# Patient Record
Sex: FEMALE | Race: WHITE | HISPANIC OR LATINO | Employment: FULL TIME | URBAN - METROPOLITAN AREA
[De-identification: names, ages, dates, MRNs, and addresses within clinical notes are randomized per-mention and may not be internally consistent; named-entity substitution may affect disease eponyms.]

---

## 2017-03-08 ENCOUNTER — VIRTUAL VISIT (OUTPATIENT)
Dept: FAMILY MEDICINE | Facility: OTHER | Age: 27
End: 2017-03-08

## 2017-03-08 NOTE — PROGRESS NOTES
"Date:   Clinician: Jodie Valladares  Clinician NPI: [id number]  Patient: Thalia Felix  Patient : 1990  Patient Address: [address] [place] [address] [place] [address]  Patient Phone: [phone]  Visit Protocol: UTI  Patient Summary:  Thalia is a 26 year old ( : 1990 ) female who initiated a Zip for a presumed bladder infection. When asked the question \"Do you have a [place] primary care physician?\", Thalia responded \"No\".    Her symptoms began 6 days ago and consist of dysuria, hesitation, foul smelling urine, and urinary frequency.   Symptom Details   Urinary Frequency: Several times each hour    She denies urgency, urinary incontinence, fever, chills, loss of appetite, nausea, hematuria, flank pain, recent antibiotic use, vomiting, abdominal pain, and vaginal discharge. Thalia has never had kidney stones. She has not been hospitalized, been a patient in a nursing home, or had a catheter in the past two weeks. She denies risk factors for sexually transmitted infections.   Thalia has not had any UTIs in the past 12 months. Her current symptoms are similar to the previous UTI symptoms. She took an antibiotic for her last infection but does not remember which one.    Thalia typically gets yeast infections when she takes antibiotics.  She states she is not pregnant and denies breastfeeding. She is currently menstruating.   She does NOT smoke or use smokeless tobacco.  MEDICATIONS:  No current medications   , ALLERGIES:   Levaquin (levofloxacin)/Cipro    Clinician Response:  Dear Thalia,  Based on the information you provided, I am unable to safely determine whether or not you have a urinary tract infection. Please be seen at your clinic or urgent care center. You will not be charged for this Zip.   Diagnosis: Unable to diagnose  Diagnosis ICD: R69  Additional Clinician Notes: Thalia, [facility]  Please be evaluated " as previously advised.

## 2017-03-17 ENCOUNTER — VIRTUAL VISIT (OUTPATIENT)
Dept: FAMILY MEDICINE | Facility: OTHER | Age: 27
End: 2017-03-17

## 2017-03-17 NOTE — PROGRESS NOTES
Date: [id number]  Clinician: Love Purvis  Clinician NPI: [id number]  Patient: osmin casanova  Patient : 1990  Patient Address: [address] [place] [address] [address] [id number]  Patient Phone: [phone]  Visit Protocol: UTI  Patient Summary:  osmin is a 26 year old ( : 1990 ) female who initiated a Zip for a presumed bladder infection.     Her symptoms began 2 days ago and consist of dysuria, hesitation, foul smelling urine, and urinary frequency.   Symptom Details   Urinary Frequency: Several times each hour    She denies urgency, urinary incontinence, fever, chills, loss of appetite, nausea, hematuria, flank pain, recent antibiotic use, vomiting, abdominal pain, and vaginal discharge. osmin has never had kidney stones. She has not been hospitalized, been a patient in a nursing home, or had a catheter in the past two weeks. She denies risk factors for sexually transmitted infections.   osmin has not had any UTIs in the past 12 months. Her current symptoms are similar to the previous UTI symptoms. She took an antibiotic for her last infection but does not remember which one.   osmin does not get yeast infections when she takes antibiotics.   She states she is not pregnant and denies breastfeeding. She has menstruated in the past month.   She does NOT smoke or use smokeless tobacco.  MEDICATIONS:  No current medications , ALLERGIES:  NKDA   Clinician Response:  Dear osmin,  Based on the information you have provided, you likely have a bladder infection, also called an acute urinary tract infection (UTI).   To treat your infection, I am prescribing:   Bactrim DS. Swallow one (1) tablet twice a day for 3 days to treat your bladder infection. Continue taking the tablets even if you feel better before all the medication is gone. There is no refill with this prescription.   Some people develop allergies to antibiotics. If you notice a new rash, significant swelling, or difficulty breathing,  stop the medication immediately and go into a clinic for physical evaluation.   To help treat your current UTI and prevent future occurrences, remember to:     Drink 8-10, 8-ounce glasses of water daily.    Urinate after sexual intercourse.    Wipe front to back after using the bathroom.     Some women may develop a yeast infection as a side effect of taking antibiotics. If you notice symptoms of a yeast infection, Zipnosis can help treat that condition as well. Simply log in and complete another Zip, which will cover all of the necessary questions to determine the best treatment for you.   You should visit a clinic for a follow-up visit if your symptoms do not improve in 1-2 days or if you experience another urinary tract infection soon after completing this treatment.  If you become pregnant during this course of treatment, stop taking the medication and contact your primary care clinician.   Diagnosis: Acute Uncomplicated Bladder Infection  Diagnosis ICD: N39.0  Additional Clinician Notes: Thalia, our records indicate that you were advised to be seen in clinic earlier today due to UTI symptoms lasting more than a week.  If this was an error, please disregard and take the antibiotic as prescribed below, if your symptoms have been going on for about 1 week,  you should be seen in clinic ASAP for a urine culture and to rule out other, more serious conditions.  Thank you for using Zipnosis.    Prescription: sulfamethoxazole-TMP DS (Bactrim DS) 800-160mg oral tablet 6 tablets, 3 days supply. Take one tablet by mouth two times a day for 3 days. Refills: 0, Refill as needed: no, Allow substitutions: yes  Prescription Sent At: March 17 16:25:30, 2017  Pharmacy: [id number] - [phone] - [address] [place] [address]

## 2020-03-01 ENCOUNTER — OFFICE VISIT (OUTPATIENT)
Dept: URGENT CARE | Facility: URGENT CARE | Age: 30
End: 2020-03-01
Payer: COMMERCIAL

## 2020-03-01 ENCOUNTER — NURSE TRIAGE (OUTPATIENT)
Dept: NURSING | Facility: CLINIC | Age: 30
End: 2020-03-01

## 2020-03-01 VITALS
DIASTOLIC BLOOD PRESSURE: 88 MMHG | TEMPERATURE: 97.9 F | OXYGEN SATURATION: 99 % | HEART RATE: 95 BPM | SYSTOLIC BLOOD PRESSURE: 128 MMHG

## 2020-03-01 DIAGNOSIS — T14.8XXA WOUND INFECTION: Primary | ICD-10-CM

## 2020-03-01 DIAGNOSIS — L08.9 WOUND INFECTION: Primary | ICD-10-CM

## 2020-03-01 PROCEDURE — 99202 OFFICE O/P NEW SF 15 MIN: CPT | Performed by: FAMILY MEDICINE

## 2020-03-01 RX ORDER — SULFAMETHOXAZOLE/TRIMETHOPRIM 800-160 MG
1 TABLET ORAL 2 TIMES DAILY
Qty: 14 TABLET | Refills: 0 | Status: SHIPPED | OUTPATIENT
Start: 2020-03-01 | End: 2020-03-08

## 2020-03-01 NOTE — PROGRESS NOTES
Subjective: 5 days ago she fell on the ice and it scraped through her clothing to give her a small abrasion on her right knee.  10 years ago she had MRSA so she is worried about infections.  It seemed to be doing okay but in the last day has gotten a lot more painful with some yellow discharge.    Objective: 2 cm very superficial abrasion with some yellow discharge and a little redness around the edge.    Assessment and plan: It is pretty small and actually looks pretty good but I cannot really explain why it is getting worse in the last day and draining more yellow pus.  We talked about culturing versus just going ahead and treating and decided to empirically treat her with Septra.  She says the reaction she had many years ago to sulfa was very minor itch and she would prefer to take the idea one for MRSA.

## 2020-03-01 NOTE — TELEPHONE ENCOUNTER
Caller scraped her knee cap falling on cement  3 days ago and now  Wound is draining  green pus and area around  wound is red and tender   triage protocol reviewed   advised assessment in  [facility]   Caller understands and will comply   Rosemary Navarro RN  FNA      Reason for Disposition    [1] Looks infected AND [2] large red area (>2 inches or 5 cm) or streak    Additional Information    Negative: [1] Major bleeding (e.g., actively dripping or spurting) AND [2] can't be stopped    Negative: Sounds like a life-threatening emergency to the triager    Negative: [1] Bleeding AND [2] won't stop after 10 minutes of direct pressure (using correct technique)    Negative: Skin is split open or gaping  (or length > 1/2 inch or 12 mm)    Negative: Skin loss goes very deep (e.g., can see bones or tendons)    Negative: Skin loss involves more than 10% of surface area (Note: the palm of the hand = 1%)    Negative: [1] Dirt in the wound AND [2] not removed with 15 minutes of scrubbing    Negative: Sounds like a serious injury to the triager    Protocols used: [id number]

## 2020-12-29 ENCOUNTER — OFFICE VISIT (OUTPATIENT)
Dept: URGENT CARE | Facility: URGENT CARE | Age: 30
End: 2020-12-29
Payer: COMMERCIAL

## 2020-12-29 ENCOUNTER — ANCILLARY PROCEDURE (OUTPATIENT)
Dept: GENERAL RADIOLOGY | Facility: CLINIC | Age: 30
End: 2020-12-29
Attending: FAMILY MEDICINE
Payer: COMMERCIAL

## 2020-12-29 VITALS
BODY MASS INDEX: 29.99 KG/M2 | SYSTOLIC BLOOD PRESSURE: 125 MMHG | HEIGHT: 65 IN | WEIGHT: 180 LBS | HEART RATE: 78 BPM | OXYGEN SATURATION: 99 % | TEMPERATURE: 98.9 F | DIASTOLIC BLOOD PRESSURE: 91 MMHG

## 2020-12-29 DIAGNOSIS — R06.02 SOB (SHORTNESS OF BREATH): Primary | ICD-10-CM

## 2020-12-29 PROCEDURE — 71046 X-RAY EXAM CHEST 2 VIEWS: CPT | Performed by: RADIOLOGY

## 2020-12-29 PROCEDURE — 99214 OFFICE O/P EST MOD 30 MIN: CPT | Performed by: FAMILY MEDICINE

## 2020-12-29 RX ORDER — ONDANSETRON 4 MG/1
TABLET, FILM COATED ORAL
COMMUNITY
Start: 2020-03-06

## 2020-12-29 RX ORDER — ELETRIPTAN HYDROBROMIDE 40 MG/1
TABLET, FILM COATED ORAL
COMMUNITY
Start: 2020-12-02

## 2020-12-29 ASSESSMENT — MIFFLIN-ST. JEOR: SCORE: 1527.35

## 2020-12-29 NOTE — PROGRESS NOTES
Subjective: Yesterday she was short of breath all day.  She woke up in the night feeling short of breath and snoring.  She does not really have a cold symptoms.  She was worried about Covid and went and did a rapid test today which was negative.  She saw her therapist yesterday and her therapist noticed that she was taking very deep breaths.  She feels like she just cannot get an adequate breath.  It feels kind of tight when she tries to take a deep breath.  She has a history of pneumonia long time ago but obviously that was a different set of symptoms.  Stress is high but nothing unusual.  She has never really had this before.  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    Objective: Vitals are stable.  ENT is normal.  Neck is normal.  Lungs are clear.  Heart is regular without murmurs.  Abdomen benign.  Chest x-ray was done and appears normal.  She seems relatively comfortable much of the time, occasionally taking deep breaths.    Assessment and plan: We had a long discussion of her symptoms.  I think they fit most with chronic hyperventilation syndrome, something that may have been set off by anxiety but is kind of taken a life of its own.  I explained how breathing patterns set it off, can get some benefit by breathing with diaphragm and by gentle exercise which should reset the automatic breathing systems.  If things do not improve further evaluation certainly could be done

## 2021-04-24 ENCOUNTER — HEALTH MAINTENANCE LETTER (OUTPATIENT)
Age: 31
End: 2021-04-24

## 2021-10-04 ENCOUNTER — HEALTH MAINTENANCE LETTER (OUTPATIENT)
Age: 31
End: 2021-10-04

## 2022-01-13 ENCOUNTER — NURSE TRIAGE (OUTPATIENT)
Dept: NURSING | Facility: CLINIC | Age: 32
End: 2022-01-13

## 2022-01-14 NOTE — TELEPHONE ENCOUNTER
Triage Call: Covid positive, fever for 3 days, stabbing chest pain, clammy, shortness of breath, no appetite. In the shower because she feels cold.     According to the protocol, patient should call 911. Care advice given. Patient verbalizes understanding but stated she is not going to call 911 and instead have someone bring her into the ED.     COVID 19 Nurse Triage Plan/Patient Instructions    Please be aware that novel coronavirus (COVID-19) may be circulating in the community. If you develop symptoms such as fever, cough, or SOB or if you have concerns about the presence of another infection including coronavirus (COVID-19), please contact your health care provider or visit [email or web address].     Disposition/Instructions    Call to EMS/911 recommended. Follow protocol based instructions.     Bring Your Own Device:  Please also bring your smart device(s) (smart phones, tablets, laptops) and their charging cables for your personal use and to communicate with your care team during your visit.    Thank you for taking steps to prevent the spread of this virus.  o Limit your contact with others.  o Wear a simple mask to cover your cough.  o Wash your hands well and often.    Resources    [facility]: About COVID-19: [email or web address]    CDC: What to Do If You're Sick: www.cdc.gov/coronavirus/2019-ncov/about/steps-when-sick.html    CDC: Ending Home Isolation: www.cdc.gov/coronavirus/2019-ncov/hcp/disposition-in-home-patients.html     CDC: Caring for Someone: www.cdc.gov/coronavirus/2019-ncov/if-you-are-sick/care-for-someone.html     [facility]: Interim Guidance for Hospital Discharge to Home: www.health.[facility].[place]/diseases/coronavirus/hcp/hospdischarge.pdf    [facility] clinical trials (COVID-19 research studies): [email or web address]/umn-clinical-trials     Below are the COVID-19 hotlines at the [place] Department of [employer] ([facility]). Interpreters are available.   o For health  questions: Call 190-397-6957 or 1-820.791.5417 (7 a.m. to 7 p.m.)  o For questions about schools and childcare: Call 759-986-1133 or 1-566.926.3034 (7 a.m. to 7 p.m.)     Jamila Clayton RN Nursing Advisor 1/13/2022 7:13 PM     Reason for Disposition    Shock suspected (e.g., cold/pale/clammy skin, too weak to stand, low BP, rapid pulse)    Additional Information    Negative: SEVERE difficulty breathing (e.g., struggling for each breath, speaks in single words)    Negative: Difficult to awaken or acting confused (e.g., disoriented, slurred speech)    Negative: Bluish (or gray) lips or face now    Protocols used: CORONAVIRUS (COVID-19) DIAGNOSED OR [id number] 8.25.2021

## 2022-05-15 ENCOUNTER — HEALTH MAINTENANCE LETTER (OUTPATIENT)
Age: 32
End: 2022-05-15

## 2022-09-11 ENCOUNTER — HEALTH MAINTENANCE LETTER (OUTPATIENT)
Age: 32
End: 2022-09-11

## 2022-12-21 ENCOUNTER — NURSE TRIAGE (OUTPATIENT)
Dept: NURSING | Facility: CLINIC | Age: 32
End: 2022-12-21

## 2022-12-21 NOTE — TELEPHONE ENCOUNTER
Patient had surgery yesterday for a deviated septum.  Patient states since surgery she has not felt good.  Patient states she has brown mucous, body aches, fever is 102.2 currently, weakness.  Patient called her surgeon and they said she needs to go in to  to be seen and evaluated. Patient then called primary provider who also suggested she go to .  Patient states she has packing her her nose and not sure how they can do a swab.  Patient wanted to know if [facility] will see her and be able to test for flu.  I said yes they can.    Patient states she will go there now.    Beatris Ray, RN   12/21/22 5:50 PM  [facility] Nurse Advisor        Reason for Disposition    General information question, no triage required and triager able to answer question    Additional Information    Negative: [1] Caller is not with the adult (patient) AND [2] reporting urgent symptoms    Negative: Lab result questions    Negative: Medication questions    Negative: Caller can't be reached by phone    Negative: Caller has already spoken to PCP or another triager    Negative: RN needs further essential information from caller in order to complete triage    Negative: Requesting regular office appointment    Negative: [1] Caller requesting NON-URGENT health information AND [2] PCP's office is the best resource    Negative: Health Information question, no triage required and triager able to answer question    Protocols used: INFORMATION ONLY CALL - [facility]

## 2022-12-22 ENCOUNTER — HOSPITAL ENCOUNTER (INPATIENT)
Facility: CLINIC | Age: 32
LOS: 1 days | Discharge: HOME | DRG: 193 | End: 2022-12-23
Attending: EMERGENCY MEDICINE | Admitting: STUDENT IN AN ORGANIZED HEALTH CARE EDUCATION/TRAINING PROGRAM
Payer: COMMERCIAL

## 2022-12-22 ENCOUNTER — APPOINTMENT (OUTPATIENT)
Dept: CT IMAGING | Facility: CLINIC | Age: 32
DRG: 193 | End: 2022-12-22
Attending: EMERGENCY MEDICINE
Payer: COMMERCIAL

## 2022-12-22 ENCOUNTER — APPOINTMENT (OUTPATIENT)
Dept: GENERAL RADIOLOGY | Facility: CLINIC | Age: 32
DRG: 193 | End: 2022-12-22
Attending: EMERGENCY MEDICINE
Payer: COMMERCIAL

## 2022-12-22 DIAGNOSIS — J96.01 ACUTE RESPIRATORY FAILURE WITH HYPOXIA (H): ICD-10-CM

## 2022-12-22 DIAGNOSIS — J10.00 PNEUMONIA DUE TO INFLUENZA A VIRUS: ICD-10-CM

## 2022-12-22 DIAGNOSIS — Z20.822 CONTACT WITH AND (SUSPECTED) EXPOSURE TO COVID-19: ICD-10-CM

## 2022-12-22 LAB
ALBUMIN SERPL BCG-MCNC: 4.1 G/DL (ref 3.5–5.2)
ALBUMIN UR-MCNC: NEGATIVE MG/DL
ALP SERPL-CCNC: 56 U/L (ref 35–104)
ALT SERPL W P-5'-P-CCNC: 28 U/L (ref 10–35)
ANION GAP SERPL CALCULATED.3IONS-SCNC: 13 MMOL/L (ref 7–15)
APPEARANCE UR: CLEAR
AST SERPL W P-5'-P-CCNC: 200 U/L (ref 10–35)
BASOPHILS # BLD AUTO: 0 10E3/UL (ref 0–0.2)
BASOPHILS NFR BLD AUTO: 0 %
BILIRUB SERPL-MCNC: 0.4 MG/DL
BILIRUB UR QL STRIP: NEGATIVE
BUN SERPL-MCNC: 8.2 MG/DL (ref 6–20)
CALCIUM SERPL-MCNC: 9 MG/DL (ref 8.6–10)
CHLORIDE SERPL-SCNC: 95 MMOL/L (ref 98–107)
COLOR UR AUTO: ABNORMAL
CREAT SERPL-MCNC: 0.65 MG/DL (ref 0.51–0.95)
DEPRECATED HCO3 PLAS-SCNC: 23 MMOL/L (ref 22–29)
EOSINOPHIL # BLD AUTO: 0 10E3/UL (ref 0–0.7)
EOSINOPHIL NFR BLD AUTO: 0 %
ERYTHROCYTE [DISTWIDTH] IN BLOOD BY AUTOMATED COUNT: 12.8 % (ref 10–15)
FLUAV RNA SPEC QL NAA+PROBE: POSITIVE
FLUBV RNA RESP QL NAA+PROBE: NEGATIVE
GFR SERPL CREATININE-BSD FRML MDRD: >90 ML/MIN/1.73M2
GLUCOSE SERPL-MCNC: 116 MG/DL (ref 70–99)
GLUCOSE UR STRIP-MCNC: NEGATIVE MG/DL
HCG SERPL QL: NEGATIVE
HCT VFR BLD AUTO: 37.5 % (ref 35–47)
HGB BLD-MCNC: 12.4 G/DL (ref 11.7–15.7)
HGB UR QL STRIP: NEGATIVE
IMM GRANULOCYTES # BLD: 0.1 10E3/UL
IMM GRANULOCYTES NFR BLD: 1 %
KETONES UR STRIP-MCNC: 20 MG/DL
LACTATE SERPL-SCNC: 0.8 MMOL/L (ref 0.7–2)
LEUKOCYTE ESTERASE UR QL STRIP: NEGATIVE
LIPASE SERPL-CCNC: 17 U/L (ref 13–60)
LYMPHOCYTES # BLD AUTO: 0.4 10E3/UL (ref 0.8–5.3)
LYMPHOCYTES NFR BLD AUTO: 6 %
MCH RBC QN AUTO: 29.2 PG (ref 26.5–33)
MCHC RBC AUTO-ENTMCNC: 33.1 G/DL (ref 31.5–36.5)
MCV RBC AUTO: 88 FL (ref 78–100)
MONOCYTES # BLD AUTO: 0.5 10E3/UL (ref 0–1.3)
MONOCYTES NFR BLD AUTO: 8 %
MUCOUS THREADS #/AREA URNS LPF: PRESENT /LPF
NEUTROPHILS # BLD AUTO: 5.8 10E3/UL (ref 1.6–8.3)
NEUTROPHILS NFR BLD AUTO: 85 %
NITRATE UR QL: NEGATIVE
NRBC # BLD AUTO: 0 10E3/UL
NRBC BLD AUTO-RTO: 0 /100
PH UR STRIP: 6.5 [PH] (ref 5–7)
PLATELET # BLD AUTO: 274 10E3/UL (ref 150–450)
POTASSIUM SERPL-SCNC: 3.5 MMOL/L (ref 3.4–5.3)
PROCALCITONIN SERPL IA-MCNC: 0.08 NG/ML
PROT SERPL-MCNC: 6.8 G/DL (ref 6.4–8.3)
RBC # BLD AUTO: 4.25 10E6/UL (ref 3.8–5.2)
RBC URINE: 3 /HPF
RSV RNA SPEC NAA+PROBE: NEGATIVE
SARS-COV-2 RNA RESP QL NAA+PROBE: NEGATIVE
SODIUM SERPL-SCNC: 131 MMOL/L (ref 136–145)
SODIUM UR-SCNC: 99 MMOL/L
SP GR UR STRIP: 1.01 (ref 1–1.03)
SQUAMOUS EPITHELIAL: 2 /HPF
UROBILINOGEN UR STRIP-MCNC: NORMAL MG/DL
WBC # BLD AUTO: 6.9 10E3/UL (ref 4–11)
WBC URINE: 1 /HPF

## 2022-12-22 PROCEDURE — 99285 EMERGENCY DEPT VISIT HI MDM: CPT | Mod: CS,25 | Performed by: EMERGENCY MEDICINE

## 2022-12-22 PROCEDURE — 250N000011 HC RX IP 250 OP 636: Performed by: EMERGENCY MEDICINE

## 2022-12-22 PROCEDURE — 74177 CT ABD & PELVIS W/CONTRAST: CPT | Mod: 26 | Performed by: RADIOLOGY

## 2022-12-22 PROCEDURE — 87205 SMEAR GRAM STAIN: CPT | Performed by: STUDENT IN AN ORGANIZED HEALTH CARE EDUCATION/TRAINING PROGRAM

## 2022-12-22 PROCEDURE — 71275 CT ANGIOGRAPHY CHEST: CPT

## 2022-12-22 PROCEDURE — 80053 COMPREHEN METABOLIC PANEL: CPT | Performed by: EMERGENCY MEDICINE

## 2022-12-22 PROCEDURE — 70450 CT HEAD/BRAIN W/O DYE: CPT

## 2022-12-22 PROCEDURE — 83605 ASSAY OF LACTIC ACID: CPT | Performed by: EMERGENCY MEDICINE

## 2022-12-22 PROCEDURE — 258N000003 HC RX IP 258 OP 636: Performed by: STUDENT IN AN ORGANIZED HEALTH CARE EDUCATION/TRAINING PROGRAM

## 2022-12-22 PROCEDURE — 120N000002 HC R&B MED SURG/OB UMMC

## 2022-12-22 PROCEDURE — 84703 CHORIONIC GONADOTROPIN ASSAY: CPT | Performed by: EMERGENCY MEDICINE

## 2022-12-22 PROCEDURE — 250N000013 HC RX MED GY IP 250 OP 250 PS 637: Performed by: STUDENT IN AN ORGANIZED HEALTH CARE EDUCATION/TRAINING PROGRAM

## 2022-12-22 PROCEDURE — 99223 1ST HOSP IP/OBS HIGH 75: CPT | Mod: GC | Performed by: STUDENT IN AN ORGANIZED HEALTH CARE EDUCATION/TRAINING PROGRAM

## 2022-12-22 PROCEDURE — 71045 X-RAY EXAM CHEST 1 VIEW: CPT

## 2022-12-22 PROCEDURE — 96374 THER/PROPH/DIAG INJ IV PUSH: CPT | Mod: 59 | Performed by: EMERGENCY MEDICINE

## 2022-12-22 PROCEDURE — 250N000011 HC RX IP 250 OP 636: Performed by: STUDENT IN AN ORGANIZED HEALTH CARE EDUCATION/TRAINING PROGRAM

## 2022-12-22 PROCEDURE — 81001 URINALYSIS AUTO W/SCOPE: CPT | Performed by: EMERGENCY MEDICINE

## 2022-12-22 PROCEDURE — 85041 AUTOMATED RBC COUNT: CPT | Performed by: EMERGENCY MEDICINE

## 2022-12-22 PROCEDURE — 74177 CT ABD & PELVIS W/CONTRAST: CPT

## 2022-12-22 PROCEDURE — 99285 EMERGENCY DEPT VISIT HI MDM: CPT | Mod: CS | Performed by: EMERGENCY MEDICINE

## 2022-12-22 PROCEDURE — 71275 CT ANGIOGRAPHY CHEST: CPT | Mod: 26 | Performed by: RADIOLOGY

## 2022-12-22 PROCEDURE — 71045 X-RAY EXAM CHEST 1 VIEW: CPT | Mod: 26 | Performed by: RADIOLOGY

## 2022-12-22 PROCEDURE — 87637 SARSCOV2&INF A&B&RSV AMP PRB: CPT | Performed by: EMERGENCY MEDICINE

## 2022-12-22 PROCEDURE — 96361 HYDRATE IV INFUSION ADD-ON: CPT | Performed by: EMERGENCY MEDICINE

## 2022-12-22 PROCEDURE — 84300 ASSAY OF URINE SODIUM: CPT | Performed by: STUDENT IN AN ORGANIZED HEALTH CARE EDUCATION/TRAINING PROGRAM

## 2022-12-22 PROCEDURE — C9803 HOPD COVID-19 SPEC COLLECT: HCPCS | Performed by: EMERGENCY MEDICINE

## 2022-12-22 PROCEDURE — 87040 BLOOD CULTURE FOR BACTERIA: CPT | Performed by: EMERGENCY MEDICINE

## 2022-12-22 PROCEDURE — 36415 COLL VENOUS BLD VENIPUNCTURE: CPT | Performed by: EMERGENCY MEDICINE

## 2022-12-22 PROCEDURE — 258N000003 HC RX IP 258 OP 636: Performed by: EMERGENCY MEDICINE

## 2022-12-22 PROCEDURE — 70450 CT HEAD/BRAIN W/O DYE: CPT | Mod: 26 | Performed by: RADIOLOGY

## 2022-12-22 PROCEDURE — 84145 PROCALCITONIN (PCT): CPT | Performed by: STUDENT IN AN ORGANIZED HEALTH CARE EDUCATION/TRAINING PROGRAM

## 2022-12-22 PROCEDURE — 250N000013 HC RX MED GY IP 250 OP 250 PS 637: Performed by: EMERGENCY MEDICINE

## 2022-12-22 PROCEDURE — 83690 ASSAY OF LIPASE: CPT | Performed by: EMERGENCY MEDICINE

## 2022-12-22 RX ORDER — TRAMADOL HYDROCHLORIDE 50 MG/1
50 TABLET ORAL EVERY 6 HOURS PRN
Status: DISCONTINUED | OUTPATIENT
Start: 2022-12-22 | End: 2022-12-23 | Stop reason: HOSPADM

## 2022-12-22 RX ORDER — NALOXONE HYDROCHLORIDE 0.4 MG/ML
0.2 INJECTION, SOLUTION INTRAMUSCULAR; INTRAVENOUS; SUBCUTANEOUS
Status: DISCONTINUED | OUTPATIENT
Start: 2022-12-22 | End: 2022-12-23 | Stop reason: HOSPADM

## 2022-12-22 RX ORDER — ACETAMINOPHEN 500 MG
1000 TABLET ORAL EVERY 8 HOURS PRN
Status: DISCONTINUED | OUTPATIENT
Start: 2022-12-22 | End: 2022-12-22

## 2022-12-22 RX ORDER — AMOXICILLIN 250 MG
1 CAPSULE ORAL 2 TIMES DAILY PRN
Status: DISCONTINUED | OUTPATIENT
Start: 2022-12-22 | End: 2022-12-23 | Stop reason: HOSPADM

## 2022-12-22 RX ORDER — CEFTRIAXONE 2 G/1
2 INJECTION, POWDER, FOR SOLUTION INTRAMUSCULAR; INTRAVENOUS EVERY 24 HOURS
Status: DISCONTINUED | OUTPATIENT
Start: 2022-12-23 | End: 2022-12-23

## 2022-12-22 RX ORDER — POLYETHYLENE GLYCOL 3350 17 G/17G
17 POWDER, FOR SOLUTION ORAL DAILY
Status: DISCONTINUED | OUTPATIENT
Start: 2022-12-22 | End: 2022-12-23 | Stop reason: HOSPADM

## 2022-12-22 RX ORDER — OSELTAMIVIR PHOSPHATE 75 MG/1
75 CAPSULE ORAL 2 TIMES DAILY
Status: DISCONTINUED | OUTPATIENT
Start: 2022-12-22 | End: 2022-12-23 | Stop reason: HOSPADM

## 2022-12-22 RX ORDER — AMOXICILLIN 250 MG
2 CAPSULE ORAL 2 TIMES DAILY PRN
Status: DISCONTINUED | OUTPATIENT
Start: 2022-12-22 | End: 2022-12-23 | Stop reason: HOSPADM

## 2022-12-22 RX ORDER — ACETAMINOPHEN 325 MG/1
975 TABLET ORAL EVERY 8 HOURS PRN
Status: DISCONTINUED | OUTPATIENT
Start: 2022-12-22 | End: 2022-12-22

## 2022-12-22 RX ORDER — LIDOCAINE 40 MG/G
CREAM TOPICAL
Status: DISCONTINUED | OUTPATIENT
Start: 2022-12-22 | End: 2022-12-23 | Stop reason: HOSPADM

## 2022-12-22 RX ORDER — ONDANSETRON 2 MG/ML
4 INJECTION INTRAMUSCULAR; INTRAVENOUS EVERY 6 HOURS PRN
Status: DISCONTINUED | OUTPATIENT
Start: 2022-12-22 | End: 2022-12-23 | Stop reason: HOSPADM

## 2022-12-22 RX ORDER — ONDANSETRON 4 MG/1
4 TABLET, ORALLY DISINTEGRATING ORAL EVERY 6 HOURS PRN
Status: DISCONTINUED | OUTPATIENT
Start: 2022-12-22 | End: 2022-12-23 | Stop reason: HOSPADM

## 2022-12-22 RX ORDER — NALOXONE HYDROCHLORIDE 0.4 MG/ML
0.4 INJECTION, SOLUTION INTRAMUSCULAR; INTRAVENOUS; SUBCUTANEOUS
Status: DISCONTINUED | OUTPATIENT
Start: 2022-12-22 | End: 2022-12-23 | Stop reason: HOSPADM

## 2022-12-22 RX ORDER — ACETAMINOPHEN 325 MG/1
650 TABLET ORAL EVERY 6 HOURS PRN
Status: DISCONTINUED | OUTPATIENT
Start: 2022-12-22 | End: 2022-12-23 | Stop reason: HOSPADM

## 2022-12-22 RX ORDER — SODIUM CHLORIDE, SODIUM LACTATE, POTASSIUM CHLORIDE, CALCIUM CHLORIDE 600; 310; 30; 20 MG/100ML; MG/100ML; MG/100ML; MG/100ML
INJECTION, SOLUTION INTRAVENOUS CONTINUOUS
Status: ACTIVE | OUTPATIENT
Start: 2022-12-22 | End: 2022-12-23

## 2022-12-22 RX ORDER — IOPAMIDOL 755 MG/ML
111 INJECTION, SOLUTION INTRAVASCULAR ONCE
Status: COMPLETED | OUTPATIENT
Start: 2022-12-22 | End: 2022-12-22

## 2022-12-22 RX ORDER — SODIUM CHLORIDE, SODIUM LACTATE, POTASSIUM CHLORIDE, CALCIUM CHLORIDE 600; 310; 30; 20 MG/100ML; MG/100ML; MG/100ML; MG/100ML
INJECTION, SOLUTION INTRAVENOUS CONTINUOUS
Status: DISCONTINUED | OUTPATIENT
Start: 2022-12-22 | End: 2022-12-22

## 2022-12-22 RX ORDER — OSELTAMIVIR PHOSPHATE 75 MG/1
75 CAPSULE ORAL DAILY
Status: DISCONTINUED | OUTPATIENT
Start: 2022-12-22 | End: 2022-12-22

## 2022-12-22 RX ORDER — CEFTRIAXONE 2 G/1
2 INJECTION, POWDER, FOR SOLUTION INTRAMUSCULAR; INTRAVENOUS ONCE
Status: COMPLETED | OUTPATIENT
Start: 2022-12-22 | End: 2022-12-22

## 2022-12-22 RX ORDER — TRAMADOL HYDROCHLORIDE 50 MG/1
50 TABLET ORAL ONCE
Status: COMPLETED | OUTPATIENT
Start: 2022-12-22 | End: 2022-12-22

## 2022-12-22 RX ORDER — POLYETHYLENE GLYCOL 3350 17 G/17G
17 POWDER, FOR SOLUTION ORAL DAILY PRN
Status: DISCONTINUED | OUTPATIENT
Start: 2022-12-22 | End: 2022-12-22

## 2022-12-22 RX ORDER — LANOLIN ALCOHOL/MO/W.PET/CERES
3 CREAM (GRAM) TOPICAL
Status: DISCONTINUED | OUTPATIENT
Start: 2022-12-22 | End: 2022-12-23 | Stop reason: HOSPADM

## 2022-12-22 RX ORDER — ONDANSETRON 2 MG/ML
4 INJECTION INTRAMUSCULAR; INTRAVENOUS ONCE
Status: COMPLETED | OUTPATIENT
Start: 2022-12-22 | End: 2022-12-22

## 2022-12-22 RX ADMIN — SODIUM CHLORIDE, POTASSIUM CHLORIDE, SODIUM LACTATE AND CALCIUM CHLORIDE: 600; 310; 30; 20 INJECTION, SOLUTION INTRAVENOUS at 18:10

## 2022-12-22 RX ADMIN — CEFTRIAXONE SODIUM 2 G: 2 INJECTION, POWDER, FOR SOLUTION INTRAMUSCULAR; INTRAVENOUS at 16:16

## 2022-12-22 RX ADMIN — ONDANSETRON HYDROCHLORIDE 4 MG: 2 INJECTION, SOLUTION INTRAMUSCULAR; INTRAVENOUS at 13:36

## 2022-12-22 RX ADMIN — ONDANSETRON HYDROCHLORIDE 4 MG: 2 INJECTION, SOLUTION INTRAMUSCULAR; INTRAVENOUS at 17:57

## 2022-12-22 RX ADMIN — ACETAMINOPHEN 650 MG: 325 TABLET, FILM COATED ORAL at 19:42

## 2022-12-22 RX ADMIN — TRAMADOL HYDROCHLORIDE 50 MG: 50 TABLET, COATED ORAL at 16:16

## 2022-12-22 RX ADMIN — SODIUM CHLORIDE 1000 ML: 9 INJECTION, SOLUTION INTRAVENOUS at 12:18

## 2022-12-22 RX ADMIN — TRAMADOL HYDROCHLORIDE 50 MG: 50 TABLET, COATED ORAL at 22:58

## 2022-12-22 RX ADMIN — SODIUM CHLORIDE, POTASSIUM CHLORIDE, SODIUM LACTATE AND CALCIUM CHLORIDE: 600; 310; 30; 20 INJECTION, SOLUTION INTRAVENOUS at 13:35

## 2022-12-22 RX ADMIN — IOPAMIDOL 111 ML: 755 INJECTION, SOLUTION INTRAVENOUS at 14:48

## 2022-12-22 RX ADMIN — AZITHROMYCIN MONOHYDRATE 500 MG: 500 INJECTION, POWDER, LYOPHILIZED, FOR SOLUTION INTRAVENOUS at 17:00

## 2022-12-22 RX ADMIN — OSELTAMIVIR PHOSPHATE 75 MG: 75 CAPSULE ORAL at 14:26

## 2022-12-22 RX ADMIN — OSELTAMIVIR PHOSPHATE 75 MG: 75 CAPSULE ORAL at 21:39

## 2022-12-22 ASSESSMENT — ACTIVITIES OF DAILY LIVING (ADL)
ADLS_ACUITY_SCORE: 35

## 2022-12-22 NOTE — ED TRIAGE NOTES
Pt had a Septoplasty done this past Tuesday. Yesterday developed fever with t-max of 103 and bodyaches. Has been taking Tylenol. Both Thalia and friend notice she is less alert.

## 2022-12-22 NOTE — H&P
[facility]    History and Physical - Medicine Service, [facility] TEAM 4       Date of Admission:  12/22/2022    Assessment & Plan      Thalia Felix is a 32 year old female admitted on 12/22/2022. 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 Hemodynamically stable.     # Acute hypoxic respiratory failure  # Influenza A infection  # Concern for superimposed bacterial pneumonia  # Fever  # Cough  # Fatigue  # AST elevation  Fever of 102 at home accompanied by cough with brown sputum, fatigue, malaise. On arrival, tachycardic, hypoxic to 86% on RA. CXR with irregular consolidative retrocardiac opacities. CTA PE negative for PE, showing bibasilar nodular consolidativ opacities with air bronchograms, concerning for infectious etiology. AST elevated on admission, likely related to influenza infection  - s/p 1L NS, mIVF @ 125mL/hr for 10 hrs  - continue IV ceftriaxone and IV azithromycin for CAP tx  - tamiflu x5 days for influenza A infection  - plan to transition to PO abx (augmentin) tomorrow if stable  - sputum culture with Gram stain ordered  - oxygen sat goal >92%, NC as needed  - follow up on blood cultures x2 collected in the ED  - nausea: PRN zofran  - pain control: tylenol PRN  - procal added on  - CBC with diff, CMP in the AM    # Abdominal pain, resolved  # Moderate stool burden on CT imaging  Pt was tender to palpation in ED. CT A/P showing moderate stool burden. Patient has not had a bowel movement since the surgery 2 days PTA. Abdomen non-tender on my exam   - start miralax daily  - senna-docusate BID PRN  - try to avoid opiates     # Recent nasal septoplasty on 12/20/22  Not concerning at this time. Consult ENT if any concern in surgical site     # Confusion, resolved  Per ED provider, patient was altered on presentation. No signs of  "encephalopathy on my exam. CTH negative for acute finding. Likely from acute infeciton.   - monitor     Diet: Combination Diet Regular Diet Adult  DVT Prophylaxis: Pneumatic Compression Devices  Novak Catheter: Not present  Fluids: on mIVF  Central Lines: None  Cardiac Monitoring: None  Code Status: Full Code    Clinically Significant Risk Factors Present on Admission         # Hyponatremia: Lowest Na = 131 mmol/L in last 2 days, will monitor as appropriate               # Overweight: Estimated body mass index is 29.95 kg/m  as calculated from the following:    Height as of this encounter: 1.651 m (5' 5\").    Weight as of this encounter: 81.6 kg (180 lb).           Disposition Plan      Expected Discharge Date: 12/24/2022                The patient's care was discussed with the Attending Physician, Dr. Puente and Patient.    Jamila Silva MD  Medicine Service, [address]  Securely message with the Vocera Web Console (learn more here)  Text page via [facility] Paging/Directory   Please see signed in provider for up to date coverage information    ______________________________________________________________________    Chief Complaint   Fever and myalgia    History is obtained from the patient    History of Present Illness   Thalia ARTURO Felix is a 32 year old female admitted on 12/22/2022. 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    Patient had a nasal septoplasty at [facility] on 12/20/22 for RICHI treatment. Patient noted that night, she had fever up to 102F. Also has been feeling generalized weakness, cough with brown color sputum, and abdominal pain. Last bowel movement Monday (1 day before surgery). Patient denies chest pain, palpitation, confusion. She felt that too many people were asking her questions all at the same time " and she was not confused. Patient denies alcohol or tobacco use.     Review of Systems    The 10 point Review of Systems is negative other than noted in the HPI or here.     Past Medical History    I have reviewed this patient's medical history and updated it with pertinent information if needed.   No past medical history on file.     Past Surgical History   I have reviewed this patient's surgical history and updated it with pertinent information if needed.   Nasal septoplasty on 12/20/22    Social History   I have reviewed this patient's social history and updated it with pertinent information if needed. Thalia Felix  reports that she has never smoked. She has never used smokeless tobacco.    Family History     No significant family history    Prior to Admission Medications   Prior to Admission Medications   Prescriptions Last Dose Informant Patient Reported? Taking?   eletriptan (RELPAX) 40 MG tablet   Yes No   ondansetron (ZOFRAN) 4 MG tablet   Yes No   Sig: TK 1 T PO Q 8 H PRF NAUSEA OR VOM      Facility-Administered Medications: None     Allergies   Allergies   Allergen Reactions     Gadolinium Hives     Pseudoephedrine      Sulfa Drugs Itching     Hydromorphone Hives, Itching and Rash     Levofloxacin Hives, Itching and Rash       Physical Exam   Vital Signs: Temp: 99.4  F (37.4  C) Temp src: Oral BP: 121/69 Pulse: 111   Resp: 18 SpO2: 91 % O2 Device: Oxymask Oxygen Delivery: 3 LPM  Weight: 180 lbs 0 oz    General Appearance: young appearing female in NAD  Eyes: anicteric sclera  HEENT: NCAT, EOMI,   Respiratory: decreased breath sounds bibasilar, no wheezes or crackles, oxymask present, nasal band present  Cardiovascular: RRR, no m/r/g  GI: soft, nontender, hypoactive BS  Skin: no lesions on exposed skin  Musculoskeletal: no tender joints  Neurologic: alert and oriented, nonfocal grossly  Psychiatric: normal mood    Data   Data reviewed today: I reviewed all medications, new labs and imaging results over  the last 24 hours. I personally reviewed CT head, CTA chest, CT A/P.     Recent Labs   Lab 12/22/22  1209   WBC 6.9   HGB 12.4   MCV 88      *   POTASSIUM 3.5   CHLORIDE 95*   CO2 23   BUN 8.2   CR 0.65   ANIONGAP 13   EDWIN 9.0   *   ALBUMIN 4.1   PROTTOTAL 6.8   BILITOTAL 0.4   ALKPHOS 56   ALT 28   *   LIPASE 17     Most Recent 3 CBC's:Recent Labs   Lab Test 12/22/22  1209   WBC 6.9   HGB 12.4   MCV 88        Most Recent 3 BMP's:Recent Labs   Lab Test 12/22/22  1209   *   POTASSIUM 3.5   CHLORIDE 95*   CO2 23   BUN 8.2   CR 0.65   ANIONGAP 13   EDWIN 9.0   *     Most Recent 2 LFT's:Recent Labs   Lab Test 12/22/22  1209   *   ALT 28   ALKPHOS 56   BILITOTAL 0.4     Recent Results (from the past 24 hour(s))   XR Chest Port 1 View    Narrative    EXAM: XR CHEST PORT 1 VIEW  12/22/2022 2:19 PM     HISTORY:  hypoxia       COMPARISON:  Chest x-ray 12/29/2020    FINDINGS:   AP portable chest, 60 degrees. Poorly timed inspiration versus mildly  low end of normal lung volumes. Trachea is midline. Cardiomediastinal  silhouette is within normal limits. There is left basilar horizontal  opacification, likely representing basilar atelectasis. Additionally,  there are couple of irregular consolidative retrocardiac opacities  seen overlying the right heart border and central retrocardiac region.  No significant pleural effusion or appreciable pneumothorax. Mild  gaseous distention of the visualized bowel, without visualized  pneumatosis or portal venous gas. No acute osseous abnormality.      Impression    IMPRESSION:  Some irregular consolidative retrocardiac opacities seen overlying the  right heart border and central retrocardiac region, difficult to  exclude infectious component. This may also represent of atelectasis  in the setting of likely left basilar atelectasis. Lateral view may be  of benefit. Recommend follow-up to clearing.    I have personally reviewed the  examination and initial interpretation  and I agree with the findings.    JAYJAY HERNANDEZ MD         SYSTEM ID:  [id number]   CT Chest Pulmonary Embolism w Contrast    Narrative    EXAM: CT CHEST PULMONARY EMBOLISM W CONTRAST  12/22/2022 3:17 PM     HISTORY:  dyspnea, eval for PE, recent surgery       COMPARISON:  Chest x-ray 12/22/2022.    TECHNIQUE: Helical technique CT chest with IV contrast per PE  protocol;axial slices with sagittal and coronal reconstructions. Total  DLP:    1917 mGy*cm. IV contrast: 111 cc IV [id number].    FINDINGS:  LUNGS:  Normal contrast filling of the pulmonary arteries with adequate IV  contrast bolus administration and timing. There is mild tapering of  the right lower lobe segmental/subsegmental branches, again, without  evidence of PE.    Central tracheobronchial tree is clear. There are bibasilar left  greater than right consolidative somewhat patchy nodular opacities  with air bronchograms, concerning for infectious etiology, such as  aspiration sequela. No significant volume loss. No pleural effusion or  pneumothorax. There are a few small scattered calcified granulomas  throughout the lung fields.    CHEST, MEDIASTINUM, AND AXILLA:  Mastoids are unremarkable. Heart size within normal limits. No  evidence of right heart strain or abnormal intracardiac filling  defects. The diameters of the main pulmonary artery and thoracic aorta  are not enlarged. No mediastinal, axillary, or appreciable hilar  lymphadenopathy. Calcified left hilar lymph node. Patent distal  esophagus.    UPPER ABDOMEN:  Non-portovenous contrast phase technique of the limited upper abdomen  is unremarkable.    VASCULATURE AND LYMPH NODES:  Grossly patent nonaneurysmal visualized major vasculature. No  appreciable lymphadenopathy.    BONES AND SOFT TISSUES:  No acute osseous abnormality. No aggressive or suspicious osseous/soft  tissue lesion identified.      Impression    IMPRESSION:  1. No evidence of  pulmonary embolus.  2. Bibasilar nodular consolidative opacities with air bronchograms in  recent documented procedure, concerning for infectious etiology,  potentially from aspiration sequela.  3. Mild sequela of prior granulomatous disease.    I have personally reviewed the examination and initial interpretation  and I agree with the findings.    MIAN JONES MD         SYSTEM ID:  [id number]   Head CT w/o contrast    Narrative    EXAM: CT HEAD W/O CONTRAST  12/22/2022 3:18 PM     HISTORY:  altered mental status and generalized weakness       COMPARISON:  None    TECHNIQUE: Using multidetector thin collimation helical acquisition  technique, axial, coronal and sagittal CT images from the skull base  to the vertex were obtained without intravenous contrast.   (topogram) image(s) also obtained and reviewed.    FINDINGS:  No acute intracranial hemorrhage, mass effect, or midline shift. No  acute loss of gray-white matter differentiation in the cerebral  hemispheres. Ventricles are proportionate to the cerebral sulci. Clear  basal cisterns.    The bony calvaria and the bones of the skull base are normal. Mild  scattered mucosal thickening of the ethmoid air cells and bilateral  maxillary sinuses. Right maxillary sinus effusion. The mastoid air  cells are clear. Grossly normal orbits.       Impression    IMPRESSION: No acute intracranial pathology.    I have personally reviewed the examination and initial interpretation  and I agree with the findings.    ISRAEL RAMIREZ MD         SYSTEM ID:  [id number]   CT Abdomen Pelvis w Contrast    Narrative    EXAMINATION: CT ABDOMEN PELVIS W CONTRAST, 12/22/2022 3:19 PM    TECHNIQUE:  Helical CT images from the lung bases through the  symphysis pubis were obtained with IV contrast.     Contrast Dose:     COMPARISON: none available     HISTORY: RLQ and mid abdominal pain, and fever    FINDINGS:    Liver: Subcentimeter hypoattenuating lesion in right hepatic dome is  too small  to characterize but favored to represent cysts.  Gallbladder: No gallstones. No evidence of acute cholecystitis.  Spleen: Normal size.  Stomach/Duodenum: Within normal limits.   Pancreas: No suspicious lesions. The pancreatic duct is not dilated.  Adrenal glands: No adrenal nodules.  Urinary system: No hydronephrosis, hydroureter, or obstructing renal  stones. Urinary bladder is unremarkable.  Bowel: Small and large bowel are normal diameter. No abnormal wall  thickening or enhancement. Normal appendix. Moderate stool burden in  the right colon.  Lymph nodes: No retroperitoneal, mesenteric, or pelvic  lymphadenopathy.  Fluid: No free fluid within the abdomen.    Bones/Soft Tissues: No suspicious osseous or soft tissue lesions.  Subtle Schmorl node deformity to the superior endplate of L1 and  inferior endplate of T12.    Lung bases/lower chest:  Partially visualized, peribronchovascular  atelectasis/airspace consolidation with mild bronchial wall thickening  and intermittent bronchial plugging throughout the lingula and  bilateral lower lobes. No pleural effusion. Small sliding-type hiatal  hernia. Heart size is normal.      Impression    IMPRESSION:    1. No acute findings in the abdomen or pelvis.  2. Moderate stool burden in the right colon.  3. Please refer to same day chest CT for further details.    I have personally reviewed the examination and initial interpretation  and I agree with the findings.    ARRON CRAMER,          SYSTEM ID:  [id number]

## 2022-12-22 NOTE — ED PROVIDER NOTES
"ED Provider Note  [facility]      History     Chief Complaint   Patient presents with     Fever     Generalized Body Aches     HPI  Thalia Felix is a 32 year old female who presents for fever, weakness, cough and body aches.   She had a septoplasty procedure by ENT at [facility] on Tuesday of this week (2 days ago) for treatment of sleep apnea. Has had some post-op bleeding that finally slowed today. Late Thursday she developed fevers with a Tmax of 102.3 at home, accompanied by productive cough, body aches and fatigue. Feels she is having a hard time standing and walking. Is on post-op oral antibiotics (Kelfex). Treating pain and fevers with tramadol, tylenol. Denies urinary symptoms. Has right sided abdominal pain today. Hx of prior abdominal surgery for endometriosis.     Arrived with tachycardia, hypoxia with O2 sat 86% on RA. Placed on O2 by oxiplus mask due to nasal splints in place.     Past Medical History  No past medical history on file.  No past surgical history on file.  eletriptan (RELPAX) 40 MG tablet  ondansetron (ZOFRAN) 4 MG tablet      Allergies   Allergen Reactions     Gadolinium Hives     Pseudoephedrine      Sulfa Drugs Itching     Hydromorphone Hives, Itching and Rash     Levofloxacin Hives, Itching and Rash     Family History  No family history on file.  Social History   Social History     Tobacco Use     Smoking status: Never     Smokeless tobacco: Never      Past medical history, past surgical history, medications, allergies, family history, and social history were reviewed with the patient. No additional pertinent items.       Review of Systems  A complete review of systems was performed with pertinent positives and negatives noted in the HPI, and all other systems negative.    Physical Exam   BP: 125/85  Pulse: (!) 129  Temp: 99.7  F (37.6  C)  Resp: 16  Height: 165.1 cm (5' 5\")  Weight: 81.6 kg (180 lb)  SpO2: (!) 86 %     Physical Exam  Gen:A&Ox3, " fatigued appearing  HEENT:PERRL, no facial tenderness or wounds, head atraumatic, oropharynx clear, mucous membranes moist, TMs clear bilaterally  Neck:no bony tenderness or step offs, no JVD, trachea midline  Back: no CVA tenderness, no midline bony tenderness  CV:tachycardia without murmurs  PULM: coarse in bases  Abd:soft, non-distended, mild tenderness that is worst in the RLQ but also present in the mid abdomen.   UE:No traumatic injuries, skin normal  LE:no traumatic injuries, skin normal, no LE edema  Neuro:CN II-XII intact, strength 5/5 throughout  Skin: no rashes or ecchymoses  ED Course      Procedures       Results for orders placed or performed during the hospital encounter of 12/22/22   XR Chest Port 1 View     Status: None    Narrative    EXAM: XR CHEST PORT 1 VIEW  12/22/2022 2:19 PM     HISTORY:  hypoxia       COMPARISON:  Chest x-ray 12/29/2020    FINDINGS:   AP portable chest, 60 degrees. Poorly timed inspiration versus mildly  low end of normal lung volumes. Trachea is midline. Cardiomediastinal  silhouette is within normal limits. There is left basilar horizontal  opacification, likely representing basilar atelectasis. Additionally,  there are couple of irregular consolidative retrocardiac opacities  seen overlying the right heart border and central retrocardiac region.  No significant pleural effusion or appreciable pneumothorax. Mild  gaseous distention of the visualized bowel, without visualized  pneumatosis or portal venous gas. No acute osseous abnormality.      Impression    IMPRESSION:  Some irregular consolidative retrocardiac opacities seen overlying the  right heart border and central retrocardiac region, difficult to  exclude infectious component. This may also represent of atelectasis  in the setting of likely left basilar atelectasis. Lateral view may be  of benefit. Recommend follow-up to clearing.    I have personally reviewed the examination and initial interpretation  and I agree  with the findings.    JAYJAY HERNANDEZ MD         SYSTEM ID:  [id number]   CT Chest Pulmonary Embolism w Contrast     Status: None    Narrative    EXAM: CT CHEST PULMONARY EMBOLISM W CONTRAST  12/22/2022 3:17 PM     HISTORY:  dyspnea, eval for PE, recent surgery       COMPARISON:  Chest x-ray 12/22/2022.    TECHNIQUE: Helical technique CT chest with IV contrast per PE  protocol;axial slices with sagittal and coronal reconstructions. Total  DLP:    1917 mGy*cm. IV contrast: 111 cc IV Isovue-370.    FINDINGS:  LUNGS:  Normal contrast filling of the pulmonary arteries with adequate IV  contrast bolus administration and timing. There is mild tapering of  the right lower lobe segmental/subsegmental branches, again, without  evidence of PE.    Central tracheobronchial tree is clear. There are bibasilar left  greater than right consolidative somewhat patchy nodular opacities  with air bronchograms, concerning for infectious etiology, such as  aspiration sequela. No significant volume loss. No pleural effusion or  pneumothorax. There are a few small scattered calcified granulomas  throughout the lung fields.    CHEST, MEDIASTINUM, AND AXILLA:  Mastoids are unremarkable. Heart size within normal limits. No  evidence of right heart strain or abnormal intracardiac filling  defects. The diameters of the main pulmonary artery and thoracic aorta  are not enlarged. No mediastinal, axillary, or appreciable hilar  lymphadenopathy. Calcified left hilar lymph node. Patent distal  esophagus.    UPPER ABDOMEN:  Non-portovenous contrast phase technique of the limited upper abdomen  is unremarkable.    VASCULATURE AND LYMPH NODES:  Grossly patent nonaneurysmal visualized major vasculature. No  appreciable lymphadenopathy.    BONES AND SOFT TISSUES:  No acute osseous abnormality. No aggressive or suspicious osseous/soft  tissue lesion identified.      Impression    IMPRESSION:  1. No evidence of pulmonary embolus.  2. Bibasilar nodular  consolidative opacities with air bronchograms in  recent documented procedure, concerning for infectious etiology,  potentially from aspiration sequela.  3. Mild sequela of prior granulomatous disease.    I have personally reviewed the examination and initial interpretation  and I agree with the findings.    MIAN JONES MD         SYSTEM ID:  [id number]   CT Abdomen Pelvis w Contrast     Status: None    Narrative    EXAMINATION: CT ABDOMEN PELVIS W CONTRAST, 12/22/2022 3:19 PM    TECHNIQUE:  Helical CT images from the lung bases through the  symphysis pubis were obtained with IV contrast.     Contrast Dose:     COMPARISON: none available     HISTORY: RLQ and mid abdominal pain, and fever    FINDINGS:    Liver: Subcentimeter hypoattenuating lesion in right hepatic dome is  too small to characterize but favored to represent cysts.  Gallbladder: No gallstones. No evidence of acute cholecystitis.  Spleen: Normal size.  Stomach/Duodenum: Within normal limits.   Pancreas: No suspicious lesions. The pancreatic duct is not dilated.  Adrenal glands: No adrenal nodules.  Urinary system: No hydronephrosis, hydroureter, or obstructing renal  stones. Urinary bladder is unremarkable.  Bowel: Small and large bowel are normal diameter. No abnormal wall  thickening or enhancement. Normal appendix. Moderate stool burden in  the right colon.  Lymph nodes: No retroperitoneal, mesenteric, or pelvic  lymphadenopathy.  Fluid: No free fluid within the abdomen.    Bones/Soft Tissues: No suspicious osseous or soft tissue lesions.  Subtle Schmorl node deformity to the superior endplate of L1 and  inferior endplate of T12.    Lung bases/lower chest:  Partially visualized, peribronchovascular  atelectasis/airspace consolidation with mild bronchial wall thickening  and intermittent bronchial plugging throughout the lingula and  bilateral lower lobes. No pleural effusion. Small sliding-type hiatal  hernia. Heart size is normal.      Impression     IMPRESSION:    1. No acute findings in the abdomen or pelvis.  2. Moderate stool burden in the right colon.  3. Please refer to same day chest CT for further details.    I have personally reviewed the examination and initial interpretation  and I agree with the findings.    ARRON CRAMER DO         SYSTEM ID:  [id number]   Head CT w/o contrast     Status: None    Narrative    EXAM: CT HEAD W/O CONTRAST  12/22/2022 3:18 PM     HISTORY:  altered mental status and generalized weakness       COMPARISON:  None    TECHNIQUE: Using multidetector thin collimation helical acquisition  technique, axial, coronal and sagittal CT images from the skull base  to the vertex were obtained without intravenous contrast.   (topogram) image(s) also obtained and reviewed.    FINDINGS:  No acute intracranial hemorrhage, mass effect, or midline shift. No  acute loss of gray-white matter differentiation in the cerebral  hemispheres. Ventricles are proportionate to the cerebral sulci. Clear  basal cisterns.    The bony calvaria and the bones of the skull base are normal. Mild  scattered mucosal thickening of the ethmoid air cells and bilateral  maxillary sinuses. Right maxillary sinus effusion. The mastoid air  cells are clear. Grossly normal orbits.       Impression    IMPRESSION: No acute intracranial pathology.    I have personally reviewed the examination and initial interpretation  and I agree with the findings.    ISRAEL RAMIREZ MD         SYSTEM ID:  [id number]   Comprehensive metabolic panel     Status: Abnormal   Result Value Ref Range    Sodium 131 (L) 136 - 145 mmol/L    Potassium 3.5 3.4 - 5.3 mmol/L    Chloride 95 (L) 98 - 107 mmol/L    Carbon Dioxide (CO2) 23 22 - 29 mmol/L    Anion Gap 13 7 - 15 mmol/L    Urea Nitrogen 8.2 6.0 - 20.0 mg/dL    Creatinine 0.65 0.51 - 0.95 mg/dL    Calcium 9.0 8.6 - 10.0 mg/dL    Glucose 116 (H) 70 - 99 mg/dL    Alkaline Phosphatase 56 35 - 104 U/L     (H) 10 - 35 U/L    ALT 28 10 -  35 U/L    Protein Total 6.8 6.4 - 8.3 g/dL    Albumin 4.1 3.5 - 5.2 g/dL    Bilirubin Total 0.4 <=1.2 mg/dL    GFR Estimate >90 >60 mL/min/1.73m2   Symptomatic Influenza A/B & SARS-CoV2 (COVID-19) Virus PCR Multiplex Nose     Status: Abnormal    Specimen: Nose; Swab   Result Value Ref Range    Influenza A PCR Positive (A) Negative    Influenza B PCR Negative Negative    RSV PCR Negative Negative    SARS CoV2 PCR Negative Negative    Narrative    Testing was performed using the Xpert Xpress CoV2/Flu/RSV Assay on the [employer] Instrument. This test should be ordered for the detection of SARS-CoV-2 and influenza viruses in individuals who meet clinical and/or epidemiological criteria. Test performance is unknown in asymptomatic patients. This test is for in vitro diagnostic use under the FDA EUA for laboratories certified under CLIA to perform high or moderate complexity testing. This test has not been FDA cleared or approved. A negative result does not rule out the presence of PCR inhibitors in the specimen or target RNA in concentration below the limit of detection for the assay. If only one viral target is positive but coinfection with multiple targets is suspected, the sample should be re-tested with another FDA cleared, approved, or authorized test, if coinfection would change clinical management. This test was validated by the [facility]. These laboratories are certified under the Clinical Laboratory Improvement Amendments of 1988 (CLIA-88) as qualified to perform high complexity laboratory testing.   Lactic acid whole blood     Status: Normal   Result Value Ref Range    Lactic Acid 0.8 0.7 - 2.0 mmol/L   CBC with platelets and differential     Status: Abnormal   Result Value Ref Range    WBC Count 6.9 4.0 - 11.0 10e3/uL    RBC Count 4.25 3.80 - 5.20 10e6/uL    Hemoglobin 12.4 11.7 - 15.7 g/dL    Hematocrit 37.5 35.0 - 47.0 %    MCV 88 78 - 100 fL    MCH 29.2 26.5 - 33.0 pg    MCHC  33.1 31.5 - 36.5 g/dL    RDW 12.8 10.0 - 15.0 %    Platelet Count 274 150 - 450 10e3/uL    % Neutrophils 85 %    % Lymphocytes 6 %    % Monocytes 8 %    % Eosinophils 0 %    % Basophils 0 %    % Immature Granulocytes 1 %    NRBCs per 100 WBC 0 <1 /100    Absolute Neutrophils 5.8 1.6 - 8.3 10e3/uL    Absolute Lymphocytes 0.4 (L) 0.8 - 5.3 10e3/uL    Absolute Monocytes 0.5 0.0 - 1.3 10e3/uL    Absolute Eosinophils 0.0 0.0 - 0.7 10e3/uL    Absolute Basophils 0.0 0.0 - 0.2 10e3/uL    Absolute Immature Granulocytes 0.1 <=0.4 10e3/uL    Absolute NRBCs 0.0 10e3/uL   Lipase     Status: Normal   Result Value Ref Range    Lipase 17 13 - 60 U/L   HCG qualitative pregnancy (blood)     Status: Normal   Result Value Ref Range    hCG Serum Qualitative Negative Negative   UA with Microscopic reflex to Culture     Status: Abnormal    Specimen: Urine, Midstream   Result Value Ref Range    Color Urine Light Yellow Colorless, Straw, Light Yellow, Yellow    Appearance Urine Clear Clear    Glucose Urine Negative Negative mg/dL    Bilirubin Urine Negative Negative    Ketones Urine 20 (A) Negative mg/dL    Specific Gravity Urine 1.013 1.003 - 1.035    Blood Urine Negative Negative    pH Urine 6.5 5.0 - 7.0    Protein Albumin Urine Negative Negative mg/dL    Urobilinogen Urine Normal Normal, 2.0 mg/dL    Nitrite Urine Negative Negative    Leukocyte Esterase Urine Negative Negative    Mucus Urine Present (A) None Seen /LPF    RBC Urine 3 (H) <=2 /HPF    WBC Urine 1 <=5 /HPF    Squamous Epithelials Urine 2 (H) <=1 /HPF    Narrative    Urine Culture not indicated   CBC with platelets differential     Status: Abnormal    Narrative    The following orders were created for panel order CBC with platelets differential.  Procedure                               Abnormality         Status                     ---------                               -----------         ------                     CBC with platelets and d...[[id number]]  Abnormal             Final result                 Please view results for these tests on the individual orders.     Medications   lactated ringers infusion ( Intravenous New Bag 12/22/22 1335)   oseltamivir (TAMIFLU) capsule 75 mg (75 mg Oral Given 12/22/22 1426)   cefTRIAXone (ROCEPHIN) 2 g vial to attach to  ml bag for ADULTS or NS 50 ml bag for PEDS (has no administration in time range)   azithromycin (ZITHROMAX) 500 mg in sodium chloride 0.9 % 250 mL intermittent infusion (has no administration in time range)   traMADol (ULTRAM) tablet 50 mg (has no administration in time range)   0.9% sodium chloride BOLUS (0 mLs Intravenous Stopped 12/22/22 1300)   ondansetron (ZOFRAN) injection 4 mg (4 mg Intravenous Given 12/22/22 1336)   iopamidol (ISOVUE-370) solution 111 mL (111 mLs Intravenous Given 12/22/22 1448)   sodium chloride (PF) 0.9% PF flush 90 mL (90 mLs Intravenous Given 12/22/22 1448)        Assessments & Plan (with Medical Decision Making)   33 yo F with a hx of sleep apnea. POD #2 from nasal septoplasty. Presenting with fevers, cough, fatigue and generalized weakness.   Arrives afebrile, but Tmax 102 at home.   Is tachycardiac and hypoxic with RA O2 sat of 86% on arrival.   Placed on O2 by oxiplus mask. Unable tolerate NC due to post-op nasal stents in place. Required 3-4L to maintain appropriate O2 sat.   Ddx included post-operative infection, pneumonia, COVID-19 infection, RSV or Flu.   IV access obtained and lab testing done, including CBC, CMP, HCG, UA, blood culture x2, viral testing.   + for influenza A. Started on tamiflu.   Imaging included CXR, CT chest and abdomen. Negative for PE or intraabdominal abnormalities. Shows bibasilar infiltrates. Treated with IV ceftriaxone and azithromycin.   Admission to the IV service.     I have reviewed the nursing notes. I have reviewed the findings, diagnosis, plan and need for follow up with the patient.    New Prescriptions    No medications on file       Final diagnoses:    Pneumonia due to influenza A virus   Acute respiratory failure with hypoxia (H)       --  Dorina Tijerina MD   [facility] EMERGENCY DEPARTMENT  12/22/2022     Dorina Tijerina MD  12/22/22 2435

## 2022-12-23 VITALS
HEART RATE: 69 BPM | RESPIRATION RATE: 18 BRPM | OXYGEN SATURATION: 96 % | HEIGHT: 65 IN | WEIGHT: 180 LBS | TEMPERATURE: 98.4 F | BODY MASS INDEX: 29.99 KG/M2 | DIASTOLIC BLOOD PRESSURE: 74 MMHG | SYSTOLIC BLOOD PRESSURE: 102 MMHG

## 2022-12-23 LAB
ALBUMIN SERPL BCG-MCNC: 3.5 G/DL (ref 3.5–5.2)
ALP SERPL-CCNC: 45 U/L (ref 35–104)
ALT SERPL W P-5'-P-CCNC: 27 U/L (ref 10–35)
ANION GAP SERPL CALCULATED.3IONS-SCNC: 10 MMOL/L (ref 7–15)
AST SERPL W P-5'-P-CCNC: 135 U/L (ref 10–35)
BASOPHILS # BLD AUTO: 0 10E3/UL (ref 0–0.2)
BASOPHILS NFR BLD AUTO: 1 %
BILIRUB SERPL-MCNC: 0.2 MG/DL
BUN SERPL-MCNC: 8.7 MG/DL (ref 6–20)
CALCIUM SERPL-MCNC: 8.8 MG/DL (ref 8.6–10)
CHLORIDE SERPL-SCNC: 104 MMOL/L (ref 98–107)
CREAT SERPL-MCNC: 0.65 MG/DL (ref 0.51–0.95)
DEPRECATED HCO3 PLAS-SCNC: 26 MMOL/L (ref 22–29)
EOSINOPHIL # BLD AUTO: 0 10E3/UL (ref 0–0.7)
EOSINOPHIL NFR BLD AUTO: 1 %
ERYTHROCYTE [DISTWIDTH] IN BLOOD BY AUTOMATED COUNT: 12.9 % (ref 10–15)
GFR SERPL CREATININE-BSD FRML MDRD: >90 ML/MIN/1.73M2
GLUCOSE SERPL-MCNC: 97 MG/DL (ref 70–99)
HCT VFR BLD AUTO: 37.8 % (ref 35–47)
HGB BLD-MCNC: 12.1 G/DL (ref 11.7–15.7)
IMM GRANULOCYTES # BLD: 0 10E3/UL
IMM GRANULOCYTES NFR BLD: 1 %
LYMPHOCYTES # BLD AUTO: 0.9 10E3/UL (ref 0.8–5.3)
LYMPHOCYTES NFR BLD AUTO: 19 %
MCH RBC QN AUTO: 29 PG (ref 26.5–33)
MCHC RBC AUTO-ENTMCNC: 32 G/DL (ref 31.5–36.5)
MCV RBC AUTO: 91 FL (ref 78–100)
MONOCYTES # BLD AUTO: 0.4 10E3/UL (ref 0–1.3)
MONOCYTES NFR BLD AUTO: 9 %
NEUTROPHILS # BLD AUTO: 3.1 10E3/UL (ref 1.6–8.3)
NEUTROPHILS NFR BLD AUTO: 69 %
NRBC # BLD AUTO: 0 10E3/UL
NRBC BLD AUTO-RTO: 0 /100
PLATELET # BLD AUTO: 235 10E3/UL (ref 150–450)
POTASSIUM SERPL-SCNC: 3.6 MMOL/L (ref 3.4–5.3)
PROT SERPL-MCNC: 6.1 G/DL (ref 6.4–8.3)
RBC # BLD AUTO: 4.17 10E6/UL (ref 3.8–5.2)
SODIUM SERPL-SCNC: 140 MMOL/L (ref 136–145)
WBC # BLD AUTO: 4.4 10E3/UL (ref 4–11)

## 2022-12-23 PROCEDURE — 99238 HOSP IP/OBS DSCHRG MGMT 30/<: CPT | Performed by: STUDENT IN AN ORGANIZED HEALTH CARE EDUCATION/TRAINING PROGRAM

## 2022-12-23 PROCEDURE — 250N000013 HC RX MED GY IP 250 OP 250 PS 637: Performed by: STUDENT IN AN ORGANIZED HEALTH CARE EDUCATION/TRAINING PROGRAM

## 2022-12-23 PROCEDURE — 36415 COLL VENOUS BLD VENIPUNCTURE: CPT | Performed by: STUDENT IN AN ORGANIZED HEALTH CARE EDUCATION/TRAINING PROGRAM

## 2022-12-23 PROCEDURE — 258N000003 HC RX IP 258 OP 636: Performed by: STUDENT IN AN ORGANIZED HEALTH CARE EDUCATION/TRAINING PROGRAM

## 2022-12-23 PROCEDURE — 85025 COMPLETE CBC W/AUTO DIFF WBC: CPT | Performed by: STUDENT IN AN ORGANIZED HEALTH CARE EDUCATION/TRAINING PROGRAM

## 2022-12-23 PROCEDURE — 80053 COMPREHEN METABOLIC PANEL: CPT | Performed by: STUDENT IN AN ORGANIZED HEALTH CARE EDUCATION/TRAINING PROGRAM

## 2022-12-23 RX ORDER — OSELTAMIVIR PHOSPHATE 75 MG/1
75 CAPSULE ORAL 2 TIMES DAILY
Qty: 8 CAPSULE | Refills: 0 | Status: SHIPPED | OUTPATIENT
Start: 2022-12-23

## 2022-12-23 RX ADMIN — ACETAMINOPHEN 650 MG: 325 TABLET, FILM COATED ORAL at 03:30

## 2022-12-23 RX ADMIN — POLYETHYLENE GLYCOL 3350 17 G: 17 POWDER, FOR SOLUTION ORAL at 08:16

## 2022-12-23 RX ADMIN — AMOXICILLIN AND CLAVULANATE POTASSIUM 1 TABLET: 875; 125 TABLET, FILM COATED ORAL at 08:13

## 2022-12-23 RX ADMIN — SODIUM CHLORIDE, POTASSIUM CHLORIDE, SODIUM LACTATE AND CALCIUM CHLORIDE: 600; 310; 30; 20 INJECTION, SOLUTION INTRAVENOUS at 01:55

## 2022-12-23 RX ADMIN — TRAMADOL HYDROCHLORIDE 50 MG: 50 TABLET, COATED ORAL at 08:13

## 2022-12-23 RX ADMIN — OSELTAMIVIR PHOSPHATE 75 MG: 75 CAPSULE ORAL at 08:13

## 2022-12-23 ASSESSMENT — ACTIVITIES OF DAILY LIVING (ADL)
ADLS_ACUITY_SCORE: 37

## 2022-12-23 NOTE — DISCHARGE SUMMARY
[facility]  Discharge Summary - Medicine & Pediatrics       Date of Admission:  12/22/2022  Date of Discharge:  12/23/2022 11:42 AM  Discharging Provider: Jamila Silva  Discharge Service: Medicine Service, ISABELL TEAM 4    Discharge Diagnoses   Acute hypoxic respiratory failure, resolved  Influenza A infection  Superimposed bacterial infection  Hx nasal septoplasty  RICHI  AST elevation      Follow-ups Needed After Discharge   Follow-up Appointments     Adult [facility] Follow-up and recommended labs and tests      Follow up with Primary Care Provider as needed if you do not feel better   after finishing your medications.     Appointments on [place] and/or [facility] (with [facility] or [facility]   provider or service). Call [phone] if you haven't heard regarding   these appointments within 7 days of discharge.             Unresulted Labs Ordered in the Past 30 Days of this Admission     Date and Time Order Name Status Description    12/22/2022  5:26 PM Respiratory Aerobic Bacterial Culture with Gram Stain Preliminary     12/22/2022 11:22 AM Blood Culture Peripheral Blood Preliminary     12/22/2022 11:22 AM Blood Culture Peripheral Blood Preliminary       These results will be followed up by PCP    Discharge Disposition   Discharged to home  Condition at discharge: Stable    Hospital Course   Thalia Felix is a 32 year old female admitted on 12/22/2022. 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 Hemodynamically stable.      # Acute hypoxic respiratory failure  # Influenza A infection  # Concern for superimposed bacterial pneumonia  # Fever  # Cough  # Fatigue  # AST elevation, improved  Fever of 102 at home accompanied by cough with brown sputum, fatigue, malaise. On arrival, tachycardic, hypoxic to 86% on RA, placed on 2 L NC. CXR with  irregular consolidative retrocardiac opacities. CTA PE negative for PE, showing bibasilar nodular consolidativ opacities with air bronchograms, concerning for infectious etiology. AST elevated on admission, likely related to influenza infection. Patient received 1 L of NS and maintenance IV fluids for 10 hours. Patient was initially started on IV ceftriaxone and azithromycin for CAP coverage. She was successfully weaned to room air. Patient was transitioned to augmentin for total 5 day course. Patient will finish 5 days of tamiflu for influenza A infection. Sputum culture collected, in process. Patient had a mild AST elevation which is likely attributed to her flu infection. It improved with appropriate treatment.    # Moderate stool burden on CT imaging  Pt was tender to palpation in ED. CT A/P showing moderate stool burden. Patient has not had a bowel movement since the sgery 2 days PTA. Abdomen non-tender on my exam. Patient was encouraged to take over the counter miralax/senna for a BM.    # Recent nasal septoplasty on 12/20/22  # RICHI  Patient to follow up with ENT as needed for post op.     # Confusion, resolved  Per ED provider, patient was altered on presentation. No signs of encephalopathy on my exam. CTH negative for acute finding. Likely from acute infeciton.     Consultations This Hospital Stay   None    Code Status   Full Code       The patient was discussed with Dr. Son.    Jamila Silva MD  [address] [facility]  [address] [place] [id number]  Phone: [phone]  Fax: [phone]  ______________________________________________________________________    Physical Exam   Vital Signs: Temp: 98.4  F (36.9  C) Temp src: Axillary BP: 102/74 Pulse: 69   Resp: 18 SpO2: 96 % O2 Device: None (Room air) Oxygen Delivery: 2 LPM  Weight: 180 lbs 0 oz  General Appearance: young appearing female resting comfortablyin NAD  Eyes: anicteric  sclera  HEENT: NCAT, EOMI  Respiratory: CTAB, no wheezes or crackles, no increased WOB, nasal band present  Cardiovascular: RRR, no m/r/g  GI: soft, nontender, active BS  Skin: no lesions on exposed skin  Musculoskeletal: no tender joints  Neurologic: alert and oriented, nonfocal grossly  Psychiatric: normal mood      Primary Care Physician   Clinic Provider    Discharge Orders      Reason for your hospital stay    You were hospitalized for fever and weakness. You were found to have influenza A infection. Based on CT scan of your chest, you likely have a superimposed bacterial infection. Please take the antiviral and antibiotic meds for 4 more days. Please return to hospital if your symptoms do not improve. Please follow up with primary care physician as needed.     Activity    Your activity upon discharge: activity as tolerated     Adult [facility] Follow-up and recommended labs and tests    Follow up with Primary Care Provider as needed if you do not feel better after finishing your medications.     Appointments on [place] and/or [facility] (with [facility] or [facility] provider or service). Call [phone] if you haven't heard regarding these appointments within 7 days of discharge.     Diet    Follow this diet upon discharge: Orders Placed This Encounter      Combination Diet Regular Diet Adult       Significant Results and Procedures   Most Recent 3 CBC's:Recent Labs   Lab Test 12/23/22  0629 12/22/22  1209   WBC 4.4 6.9   HGB 12.1 12.4   MCV 91 88    274     Most Recent 3 BMP's:Recent Labs   Lab Test 12/23/22  0629 12/22/22  1209    131*   POTASSIUM 3.6 3.5   CHLORIDE 104 95*   CO2 26 23   BUN 8.7 8.2   CR 0.65 0.65   ANIONGAP 10 13   EDWIN 8.8 9.0   GLC 97 116*     Most Recent 2 LFT's:Recent Labs   Lab Test 12/23/22  0629 12/22/22  1209   * 200*   ALT 27 28   ALKPHOS 45 56   BILITOTAL 0.2 0.4   ,   Results for orders placed or performed during the hospital encounter of 12/22/22   XR Chest Port  1 View    Narrative    EXAM: XR CHEST PORT 1 VIEW  12/22/2022 2:19 PM     HISTORY:  hypoxia       COMPARISON:  Chest x-ray 12/29/2020    FINDINGS:   AP portable chest, 60 degrees. Poorly timed inspiration versus mildly  low end of normal lung volumes. Trachea is midline. Cardiomediastinal  silhouette is within normal limits. There is left basilar horizontal  opacification, likely representing basilar atelectasis. Additionally,  there are couple of irregular consolidative retrocardiac opacities  seen overlying the right heart border and central retrocardiac region.  No significant pleural effusion or appreciable pneumothorax. Mild  gaseous distention of the visualized bowel, without visualized  pneumatosis or portal venous gas. No acute osseous abnormality.      Impression    IMPRESSION:  Some irregular consolidative retrocardiac opacities seen overlying the  right heart border and central retrocardiac region, difficult to  exclude infectious component. This may also represent of atelectasis  in the setting of likely left basilar atelectasis. Lateral view may be  of benefit. Recommend follow-up to clearing.    I have personally reviewed the examination and initial interpretation  and I agree with the findings.    JAYJAY HERNANDEZ MD         SYSTEM ID:  [id number]   CT Chest Pulmonary Embolism w Contrast    Narrative    EXAM: CT CHEST PULMONARY EMBOLISM W CONTRAST  12/22/2022 3:17 PM     HISTORY:  dyspnea, eval for PE, recent surgery       COMPARISON:  Chest x-ray 12/22/2022.    TECHNIQUE: Helical technique CT chest with IV contrast per PE  protocol;axial slices with sagittal and coronal reconstructions. Total  DLP:    1917 mGy*cm. IV contrast: 111 cc IV Isovue-[id number].    FINDINGS:  LUNGS:  Normal contrast filling of the pulmonary arteries with adequate IV  contrast bolus administration and timing. There is mild tapering of  the right lower lobe segmental/subsegmental branches, again, without  evidence of PE.    Central  tracheobronchial tree is clear. There are bibasilar left  greater than right consolidative somewhat patchy nodular opacities  with air bronchograms, concerning for infectious etiology, such as  aspiration sequela. No significant volume loss. No pleural effusion or  pneumothorax. There are a few small scattered calcified granulomas  throughout the lung fields.    CHEST, MEDIASTINUM, AND AXILLA:  Mastoids are unremarkable. Heart size within normal limits. No  evidence of right heart strain or abnormal intracardiac filling  defects. The diameters of the main pulmonary artery and thoracic aorta  are not enlarged. No mediastinal, axillary, or appreciable hilar  lymphadenopathy. Calcified left hilar lymph node. Patent distal  esophagus.    UPPER ABDOMEN:  Non-portovenous contrast phase technique of the limited upper abdomen  is unremarkable.    VASCULATURE AND LYMPH NODES:  Grossly patent nonaneurysmal visualized major vasculature. No  appreciable lymphadenopathy.    BONES AND SOFT TISSUES:  No acute osseous abnormality. No aggressive or suspicious osseous/soft  tissue lesion identified.      Impression    IMPRESSION:  1. No evidence of pulmonary embolus.  2. Bibasilar nodular consolidative opacities with air bronchograms in  recent documented procedure, concerning for infectious etiology,  potentially from aspiration sequela.  3. Mild sequela of prior granulomatous disease.    I have personally reviewed the examination and initial interpretation  and I agree with the findings.    MIAN JONES MD         SYSTEM ID:  [id number]   CT Abdomen Pelvis w Contrast    Narrative    EXAMINATION: CT ABDOMEN PELVIS W CONTRAST, 12/22/2022 3:19 PM    TECHNIQUE:  Helical CT images from the lung bases through the  symphysis pubis were obtained with IV contrast.     Contrast Dose:     COMPARISON: none available     HISTORY: RLQ and mid abdominal pain, and fever    FINDINGS:    Liver: Subcentimeter hypoattenuating lesion in right hepatic  dome is  too small to characterize but favored to represent cysts.  Gallbladder: No gallstones. No evidence of acute cholecystitis.  Spleen: Normal size.  Stomach/Duodenum: Within normal limits.   Pancreas: No suspicious lesions. The pancreatic duct is not dilated.  Adrenal glands: No adrenal nodules.  Urinary system: No hydronephrosis, hydroureter, or obstructing renal  stones. Urinary bladder is unremarkable.  Bowel: Small and large bowel are normal diameter. No abnormal wall  thickening or enhancement. Normal appendix. Moderate stool burden in  the right colon.  Lymph nodes: No retroperitoneal, mesenteric, or pelvic  lymphadenopathy.  Fluid: No free fluid within the abdomen.    Bones/Soft Tissues: No suspicious osseous or soft tissue lesions.  Subtle Schmorl node deformity to the superior endplate of L1 and  inferior endplate of T12.    Lung bases/lower chest:  Partially visualized, peribronchovascular  atelectasis/airspace consolidation with mild bronchial wall thickening  and intermittent bronchial plugging throughout the lingula and  bilateral lower lobes. No pleural effusion. Small sliding-type hiatal  hernia. Heart size is normal.      Impression    IMPRESSION:    1. No acute findings in the abdomen or pelvis.  2. Moderate stool burden in the right colon.  3. Please refer to same day chest CT for further details.    I have personally reviewed the examination and initial interpretation  and I agree with the findings.    ARRON CRAMER,          SYSTEM ID:  [id number]   Head CT w/o contrast    Narrative    EXAM: CT HEAD W/O CONTRAST  12/22/2022 3:18 PM     HISTORY:  altered mental status and generalized weakness       COMPARISON:  None    TECHNIQUE: Using multidetector thin collimation helical acquisition  technique, axial, coronal and sagittal CT images from the skull base  to the vertex were obtained without intravenous contrast.   (topogram) image(s) also obtained and reviewed.    FINDINGS:  No acute  intracranial hemorrhage, mass effect, or midline shift. No  acute loss of gray-white matter differentiation in the cerebral  hemispheres. Ventricles are proportionate to the cerebral sulci. Clear  basal cisterns.    The bony calvaria and the bones of the skull base are normal. Mild  scattered mucosal thickening of the ethmoid air cells and bilateral  maxillary sinuses. Right maxillary sinus effusion. The mastoid air  cells are clear. Grossly normal orbits.       Impression    IMPRESSION: No acute intracranial pathology.    I have personally reviewed the examination and initial interpretation  and I agree with the findings.    ISRAEL RAMIREZ MD         SYSTEM ID:  [id number]       Discharge Medications   Current Discharge Medication List      START taking these medications    Details   amoxicillin-clavulanate (AUGMENTIN) 875-125 MG tablet Take 1 tablet by mouth every 12 hours  Qty: 8 tablet, Refills: 0    Associated Diagnoses: Pneumonia due to influenza A virus      oseltamivir (TAMIFLU) 75 MG capsule Take 1 capsule (75 mg) by mouth 2 times daily  Qty: 8 capsule, Refills: 0    Associated Diagnoses: Pneumonia due to influenza A virus         CONTINUE these medications which have NOT CHANGED    Details   eletriptan (RELPAX) 40 MG tablet       ondansetron (ZOFRAN) 4 MG tablet TK 1 T PO Q 8 H PRF NAUSEA OR VOM           Allergies   Allergies   Allergen Reactions     Gadolinium Hives     Pseudoephedrine      Sulfa Drugs Itching     Hydromorphone Hives, Itching and Rash     Levofloxacin Hives, Itching and Rash

## 2022-12-23 NOTE — PLAN OF CARE
"Shift: [id number]    VS: /74 (BP Location: Right arm)   Pulse 69   Temp 98.4  F (36.9  C) (Axillary)   Resp 18   Ht 1.651 m (5' 5\")   Wt 81.6 kg (180 lb)   SpO2 96%   BMI 29.95 kg/m      Pain: Pt c/o of body aches, given PRN tylenol and tramadol with relieved.   Neuro: A/Ox4   Cardiac: On tele  Respiratory: On oxymask at 2L overnight for comfort  Diet/Appetite: Good appetite   /GI: No BM, voided twice overnight  LDA's: Right PIV SL  Skin: Dry and intact  Activity: Stand by assist   Pertinent labs: Am labs     "

## 2022-12-23 NOTE — PROGRESS NOTES
Care Management Discharge Note    Discharge Date: 12/23/2022       Discharge Disposition: Home    Discharge Services:  Transportation services    Discharge DME: none      Discharge Transportation:  Taxi    Private pay costs discussed: Not applicable    PAS Confirmation Code:  N/A  Patient/family educated on Medicare website which has current facility and service quality ratings:  N/A    Education Provided on the Discharge Plan:    Persons Notified of Discharge Plans: Yes  Patient/Family in Agreement with the Plan:  [place]    Handoff Referral Completed: No, no PCP on file.     Additional Information:  Chart reviewed. Case discussed with bedside RN and medical provider.    1049  NELY was informed pt will need a ride at discharge. NELY review chart. Pt's insurance is [employer]. NELY called [employer] Ride: [phone]. They are not setting up same day rides due to weather. NELY confirmed pt's address with bedside nurse, Myrna. Address pt will be going to is: [address] [place] [address].     NELY called [employer] ([phone]) set up one way taxi ride for pt.      is available ad will continue to follow for discharge planning and supports as needed.     _______________________    INDU Stewart, LSW  ED/OBS   [facility]  Phone: [phone]  Pager: [phone]  Fax: [phone]     On-call pager, [phone], 4:00 pm to midnight

## 2022-12-23 NOTE — PLAN OF CARE
Transferred from ED @ 18:00.  Admitted for    fatigue, pneumonia and influenza A and acute respiratory failure.  Afebrile, VSS on RA.  SOB on exertion.  Alert and oriented x4.  Denies pain.  Nausea resolving.  Nose gauze changed.  R PIV infusing LR @ 125 ml/hr.  Up with SBA.  Continue plan of care.

## 2022-12-23 NOTE — PROGRESS NOTES
"[id number]    Vital signs:  Temp: 98.4  F (36.9  C) Temp src: Axillary BP: 102/74 Pulse: 69   Resp: 18 SpO2: 96 % O2 Device: None (Room air) Oxygen Delivery: 2 LPM Height: 165.1 cm (5' 5\") Weight: 81.6 kg (180 lb)  Estimated body mass index is 29.95 kg/m  as calculated from the following:    Height as of this encounter: 1.651 m (5' 5\").    Weight as of this encounter: 81.6 kg (180 lb).    Nursing Focus: Discharge    D: Patient discharged to home at 1120 via taxi set up by NELY.    I: Discharge prescriptions sent to discharge pharmacy to be filled. All discharge medications and instructions reviewed with the patient. Patient instructed to call clinic triage nurse if she experiences a fever >100.4, uncontrolled nausea, vomiting, diarrhea, or pain; or experiences any signs or symptoms of bleeding. Other phone numbers to call with questions or concerns after discharge reviewed. PIV removed. Education completed. All belonging sent with patient.    A: Thalia verbalized understanding of discharge medications and instructions. Discharge medications picked up by RN and handed to pt.     P: Patient to follow-up w/ PCP as needed.      "

## 2022-12-25 LAB
BACTERIA SPT CULT: NORMAL
GRAM STAIN RESULT: NORMAL

## 2022-12-27 LAB
BACTERIA BLD CULT: NO GROWTH
BACTERIA BLD CULT: NO GROWTH

## 2022-12-30 ENCOUNTER — APPOINTMENT (OUTPATIENT)
Dept: CT IMAGING | Facility: CLINIC | Age: 32
End: 2022-12-30
Attending: EMERGENCY MEDICINE
Payer: COMMERCIAL

## 2022-12-30 ENCOUNTER — HOSPITAL ENCOUNTER (EMERGENCY)
Facility: CLINIC | Age: 32
Discharge: HOME | End: 2022-12-30
Attending: EMERGENCY MEDICINE | Admitting: EMERGENCY MEDICINE
Payer: COMMERCIAL

## 2022-12-30 VITALS
WEIGHT: 210 LBS | OXYGEN SATURATION: 99 % | SYSTOLIC BLOOD PRESSURE: 115 MMHG | DIASTOLIC BLOOD PRESSURE: 69 MMHG | HEIGHT: 65 IN | HEART RATE: 72 BPM | BODY MASS INDEX: 34.99 KG/M2 | TEMPERATURE: 98 F | RESPIRATION RATE: 17 BRPM

## 2022-12-30 DIAGNOSIS — R61 DIAPHORESIS: ICD-10-CM

## 2022-12-30 DIAGNOSIS — R06.02 SHORTNESS OF BREATH: ICD-10-CM

## 2022-12-30 LAB
ALBUMIN SERPL BCG-MCNC: 4.2 G/DL (ref 3.5–5.2)
ALBUMIN UR-MCNC: NEGATIVE MG/DL
ALP SERPL-CCNC: 59 U/L (ref 35–104)
ALT SERPL W P-5'-P-CCNC: 14 U/L (ref 10–35)
ANION GAP SERPL CALCULATED.3IONS-SCNC: 13 MMOL/L (ref 7–15)
APPEARANCE UR: CLEAR
AST SERPL W P-5'-P-CCNC: 20 U/L (ref 10–35)
ATRIAL RATE - MUSE: 97 BPM
BASOPHILS # BLD AUTO: 0 10E3/UL (ref 0–0.2)
BASOPHILS NFR BLD AUTO: 1 %
BILIRUB DIRECT SERPL-MCNC: <0.2 MG/DL (ref 0–0.3)
BILIRUB SERPL-MCNC: 0.2 MG/DL
BILIRUB UR QL STRIP: NEGATIVE
BUN SERPL-MCNC: 13.3 MG/DL (ref 6–20)
CALCIUM SERPL-MCNC: 9.6 MG/DL (ref 8.6–10)
CHLORIDE SERPL-SCNC: 105 MMOL/L (ref 98–107)
COLOR UR AUTO: ABNORMAL
CREAT SERPL-MCNC: 0.61 MG/DL (ref 0.51–0.95)
DEPRECATED HCO3 PLAS-SCNC: 22 MMOL/L (ref 22–29)
DIASTOLIC BLOOD PRESSURE - MUSE: NORMAL MMHG
EOSINOPHIL # BLD AUTO: 0.1 10E3/UL (ref 0–0.7)
EOSINOPHIL NFR BLD AUTO: 1 %
ERYTHROCYTE [DISTWIDTH] IN BLOOD BY AUTOMATED COUNT: 12.4 % (ref 10–15)
GFR SERPL CREATININE-BSD FRML MDRD: >90 ML/MIN/1.73M2
GLUCOSE SERPL-MCNC: 97 MG/DL (ref 70–99)
GLUCOSE UR STRIP-MCNC: NEGATIVE MG/DL
HCG SERPL QL: NEGATIVE
HCT VFR BLD AUTO: 40.8 % (ref 35–47)
HGB BLD-MCNC: 13.6 G/DL (ref 11.7–15.7)
HGB UR QL STRIP: NEGATIVE
IMM GRANULOCYTES # BLD: 0.1 10E3/UL
IMM GRANULOCYTES NFR BLD: 2 %
INTERPRETATION ECG - MUSE: NORMAL
KETONES UR STRIP-MCNC: NEGATIVE MG/DL
LEUKOCYTE ESTERASE UR QL STRIP: NEGATIVE
LYMPHOCYTES # BLD AUTO: 2.4 10E3/UL (ref 0.8–5.3)
LYMPHOCYTES NFR BLD AUTO: 36 %
MCH RBC QN AUTO: 28.5 PG (ref 26.5–33)
MCHC RBC AUTO-ENTMCNC: 33.3 G/DL (ref 31.5–36.5)
MCV RBC AUTO: 86 FL (ref 78–100)
MONOCYTES # BLD AUTO: 0.5 10E3/UL (ref 0–1.3)
MONOCYTES NFR BLD AUTO: 8 %
MUCOUS THREADS #/AREA URNS LPF: PRESENT /LPF
NEUTROPHILS # BLD AUTO: 3.5 10E3/UL (ref 1.6–8.3)
NEUTROPHILS NFR BLD AUTO: 52 %
NITRATE UR QL: NEGATIVE
NRBC # BLD AUTO: 0 10E3/UL
NRBC BLD AUTO-RTO: 0 /100
P AXIS - MUSE: 34 DEGREES
PH UR STRIP: 5.5 [PH] (ref 5–7)
PLATELET # BLD AUTO: 436 10E3/UL (ref 150–450)
POTASSIUM SERPL-SCNC: 3.6 MMOL/L (ref 3.4–5.3)
PR INTERVAL - MUSE: 136 MS
PROT SERPL-MCNC: 7 G/DL (ref 6.4–8.3)
QRS DURATION - MUSE: 86 MS
QT - MUSE: 350 MS
QTC - MUSE: 444 MS
R AXIS - MUSE: -2 DEGREES
RBC # BLD AUTO: 4.77 10E6/UL (ref 3.8–5.2)
RBC URINE: <1 /HPF
SODIUM SERPL-SCNC: 140 MMOL/L (ref 136–145)
SP GR UR STRIP: 1.01 (ref 1–1.03)
SQUAMOUS EPITHELIAL: 4 /HPF
SYSTOLIC BLOOD PRESSURE - MUSE: NORMAL MMHG
T AXIS - MUSE: 5 DEGREES
TROPONIN T SERPL HS-MCNC: 12 NG/L
UROBILINOGEN UR STRIP-MCNC: NORMAL MG/DL
VENTRICULAR RATE- MUSE: 97 BPM
WBC # BLD AUTO: 6.6 10E3/UL (ref 4–11)
WBC URINE: <1 /HPF

## 2022-12-30 PROCEDURE — 82310 ASSAY OF CALCIUM: CPT | Performed by: EMERGENCY MEDICINE

## 2022-12-30 PROCEDURE — 85025 COMPLETE CBC W/AUTO DIFF WBC: CPT | Performed by: EMERGENCY MEDICINE

## 2022-12-30 PROCEDURE — 93010 ELECTROCARDIOGRAM REPORT: CPT | Performed by: EMERGENCY MEDICINE

## 2022-12-30 PROCEDURE — 96361 HYDRATE IV INFUSION ADD-ON: CPT

## 2022-12-30 PROCEDURE — 84484 ASSAY OF TROPONIN QUANT: CPT | Performed by: EMERGENCY MEDICINE

## 2022-12-30 PROCEDURE — 81001 URINALYSIS AUTO W/SCOPE: CPT | Performed by: EMERGENCY MEDICINE

## 2022-12-30 PROCEDURE — 82248 BILIRUBIN DIRECT: CPT | Performed by: EMERGENCY MEDICINE

## 2022-12-30 PROCEDURE — 99285 EMERGENCY DEPT VISIT HI MDM: CPT | Mod: 25

## 2022-12-30 PROCEDURE — 258N000003 HC RX IP 258 OP 636: Performed by: EMERGENCY MEDICINE

## 2022-12-30 PROCEDURE — 36415 COLL VENOUS BLD VENIPUNCTURE: CPT | Performed by: EMERGENCY MEDICINE

## 2022-12-30 PROCEDURE — 250N000011 HC RX IP 250 OP 636: Performed by: EMERGENCY MEDICINE

## 2022-12-30 PROCEDURE — 99285 EMERGENCY DEPT VISIT HI MDM: CPT | Mod: 25 | Performed by: EMERGENCY MEDICINE

## 2022-12-30 PROCEDURE — 71275 CT ANGIOGRAPHY CHEST: CPT | Mod: 26 | Performed by: RADIOLOGY

## 2022-12-30 PROCEDURE — 93005 ELECTROCARDIOGRAM TRACING: CPT

## 2022-12-30 PROCEDURE — 84703 CHORIONIC GONADOTROPIN ASSAY: CPT | Performed by: EMERGENCY MEDICINE

## 2022-12-30 PROCEDURE — 71275 CT ANGIOGRAPHY CHEST: CPT

## 2022-12-30 PROCEDURE — 96360 HYDRATION IV INFUSION INIT: CPT | Mod: 59

## 2022-12-30 RX ORDER — IOPAMIDOL 755 MG/ML
69 INJECTION, SOLUTION INTRAVASCULAR ONCE
Status: COMPLETED | OUTPATIENT
Start: 2022-12-30 | End: 2022-12-30

## 2022-12-30 RX ADMIN — SODIUM CHLORIDE 1000 ML: 9 INJECTION, SOLUTION INTRAVENOUS at 17:11

## 2022-12-30 RX ADMIN — IOPAMIDOL 69 ML: 755 INJECTION, SOLUTION INTRAVENOUS at 18:15

## 2022-12-30 RX ADMIN — SODIUM CHLORIDE 1000 ML: 9 INJECTION, SOLUTION INTRAVENOUS at 18:36

## 2022-12-30 ASSESSMENT — ENCOUNTER SYMPTOMS
COUGH: 1
ARTHRALGIAS: 0
CONFUSION: 0
DIFFICULTY URINATING: 0
FEVER: 0
SHORTNESS OF BREATH: 1
NAUSEA: 1
HEADACHES: 0
DIAPHORESIS: 1
VOMITING: 1
PALPITATIONS: 1
FATIGUE: 1
FLANK PAIN: 0
ABDOMINAL PAIN: 0
CHILLS: 1
DIARRHEA: 0
SORE THROAT: 1
COLOR CHANGE: 0
EYE REDNESS: 0

## 2022-12-30 ASSESSMENT — ACTIVITIES OF DAILY LIVING (ADL)
ADLS_ACUITY_SCORE: 35

## 2022-12-30 NOTE — ED PROVIDER NOTES
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      She had a septoplasty on 12/20/22. On 12/22/22 was in the ED for influenza, also had superimposed bacterial pneumonia. Was admitted for one day, was on oxygen briefly and then weaned off.     Cough is improved from previous, still present but improved. Now coughing up light yellow sputum, was previously dark brown. Was given Tamiflu for this - took 3 days and then discontinued it because of nausea and vomiting. Felt better from a n/v standpoint after stopping this. Was also given Augmentin due to superimposed bacterial pneumonia, and finished the entire course.  Now feels SOB - unsure if this is worse than previous. She is feeling chest pressure especially in mid sternal region and just to the left particularly with deep inspiration. Also feels upper back pain on the left side, again with inspiration. No fevers in the past few days. Still has some mucous and bloody nasal discharge 2/2 recent septoplasty - nasal splints removed just a few days ago. Slight sore throat ever since surgery. Some palpitations with even minimal exertion, but denies irregular heartbeat.  No n/v since being off Tamiflu. No abdominal pain. No diarrhea. No urinary symptoms. LMP \"18-20 days ago\" - denies possibility of pregnancy.       Per chart review patient admitted here 12/22 after presenting with fever, cough w/ brown sputum, fatigue, and malaise. On arrival, pt tachycardic, hypoxic to 86% on RA, placed on 2 L NC. CXR with irregular " consolidative retrocardiac opacities. CTA PE negative for PE, showing bibasilar nodular consolidative opacities with air bronchograms, concerning for infectious etiology. Patient received 1 L of NS and maintenance IV fluids for 10 hours. Patient was initially started on IV ceftriaxone and azithromycin for CAP coverage. She was successfully weaned to room air. Patient was transitioned to augmentin for total 5 day course. Patient to finish 5 days of tamiflu for influenza A infection. Sputum culture collected, in process at discharge, eventually returned negative. Additionally, patient had a mild AST elevation likely attributed to her flu infection and improved with appropriate treatment. ED provider noted patient to be altered on arrival. No signs of encephalopathy on admitting provider exam. CTH negative for acute finding. Likely from acute infeciton. Abdomen tender to palpation in ED. CT A/P showing moderate stool burden. Patient had not had a bowel movement since the surgery 2 days PTA. Abdomen non-tender on admission exam. Patient was encouraged to take over the counter miralax/senna for a BM.      XR chest port 1 VW 12/22  IMPRESSION:  Some irregular consolidative retrocardiac opacities seen overlying the  right heart border and central retrocardiac region, difficult to  exclude infectious component. This may also represent of atelectasis  in the setting of likely left basilar atelectasis. Lateral view may be  of benefit. Recommend follow-up to clearing.     CT PE 12/22  IMPRESSION:  1. No evidence of pulmonary embolus.  2. Bibasilar nodular consolidative opacities with air bronchograms in  recent documented procedure, concerning for infectious etiology,  potentially from aspiration sequela.  3. Mild sequela of prior granulomatous disease.    CT CAP w/ contrast 12/22  IMPRESSION:     1. No acute findings in the abdomen or pelvis.  2. Moderate stool burden in the right colon.  3. Please refer to same day chest CT  "for further details.    CT head w/o 12/22  IMPRESSION: No acute intracranial pathology.  .  Past Medical History  No past medical history on file.  No past surgical history on file.  amoxicillin-clavulanate (AUGMENTIN) 875-125 MG tablet  eletriptan (RELPAX) 40 MG tablet  ondansetron (ZOFRAN) 4 MG tablet  oseltamivir (TAMIFLU) 75 MG capsule      Allergies   Allergen Reactions     Pseudoephedrine      Sulfa Drugs Itching     Hydromorphone Hives, Itching and Rash     Levofloxacin Hives, Itching and Rash     Family History  No family history on file.  Social History   Social History     Tobacco Use     Smoking status: Never     Smokeless tobacco: Never      Past medical history, past surgical history, medications, allergies, family history, and social history were reviewed with the patient. No additional pertinent items.       Review of Systems   Constitutional: Positive for chills, diaphoresis and fatigue. Negative for fever.   HENT: Positive for congestion, nosebleeds and sore throat.    Eyes: Negative for redness.   Respiratory: Positive for cough and shortness of breath.    Cardiovascular: Positive for chest pain and palpitations. Negative for leg swelling.   Gastrointestinal: Positive for nausea and vomiting. Negative for abdominal pain and diarrhea.   Genitourinary: Negative for difficulty urinating and flank pain.   Musculoskeletal: Negative for arthralgias.   Skin: Negative for color change.   Neurological: Negative for headaches.   Psychiatric/Behavioral: Negative for confusion.   All other systems reviewed and are negative.    A complete review of systems was performed with pertinent positives and negatives noted in the HPI, and all other systems negative.    Physical Exam   BP: 122/88  Pulse: 108  Temp: 98  F (36.7  C)  Resp: 18  Height: 165.1 cm (5' 5\")  Weight: 95.3 kg (210 lb)  SpO2: 97 %  Physical Exam  Vitals and nursing note reviewed.   Constitutional:       General: She is not in acute distress.     " Appearance: She is not diaphoretic.      Comments: Adult female, appears to feel unwell, NAD   HENT:      Head: Atraumatic.      Mouth/Throat:      Mouth: Mucous membranes are moist.      Pharynx: Oropharynx is clear. No oropharyngeal exudate.   Eyes:      General: No scleral icterus.     Pupils: Pupils are equal, round, and reactive to light.   Cardiovascular:      Rate and Rhythm: Tachycardia present.      Pulses: Normal pulses.      Heart sounds: Normal heart sounds. No murmur heard.  Pulmonary:      Effort: Pulmonary effort is normal. No respiratory distress.      Comments: Slightly coarse breath sounds B, no wheezing. Slight rhonchi noted in upper lobes B. No respiratory distress  Abdominal:      General: Bowel sounds are normal.      Palpations: Abdomen is soft.      Tenderness: There is no abdominal tenderness.   Musculoskeletal:         General: No tenderness.      Right lower leg: No edema.      Left lower leg: No edema.   Skin:     General: Skin is warm.      Findings: No rash.   Neurological:      Mental Status: She is alert.         ED Course      Procedures             EKG Interpretation:      Interpreted by Coleen Contreras MD  Time reviewed:1720   Symptoms at time of EKG: SOB   Rhythm: Normal sinus   Rate: Normal  Axis: Normal  Ectopy: None  Conduction: Normal  ST Segments/ T Waves: No ST-T wave changes and No acute ischemic changes  Q Waves: None  Comparison to prior: No old EKG available    Clinical Impression: normal EKG      The medical record was reviewed and interpreted.  Current labs reviewed and interpreted.  Previous labs reviewed and interpreted.              Results for orders placed or performed during the hospital encounter of 12/30/22   CT Chest Pulmonary Embolism w Contrast     Status: None (Preliminary result)    Impression    RESIDENT PRELIMINARY INTERPRETATION  IMPRESSION:   1. No evidence of pulmonary embolism.  2. Near complete resolution of previously demonstrated  bibasilar  consolidative opacities. No new airspace disease.   HCG qualitative pregnancy (blood)     Status: Normal   Result Value Ref Range    hCG Serum Qualitative Negative Negative   Basic metabolic panel     Status: Normal   Result Value Ref Range    Sodium 140 136 - 145 mmol/L    Potassium 3.6 3.4 - 5.3 mmol/L    Chloride 105 98 - 107 mmol/L    Carbon Dioxide (CO2) 22 22 - 29 mmol/L    Anion Gap 13 7 - 15 mmol/L    Urea Nitrogen 13.3 6.0 - 20.0 mg/dL    Creatinine 0.61 0.51 - 0.95 mg/dL    Calcium 9.6 8.6 - 10.0 mg/dL    Glucose 97 70 - 99 mg/dL    GFR Estimate >90 >60 mL/min/1.73m2   Troponin T, High Sensitivity     Status: Normal   Result Value Ref Range    Troponin T, High Sensitivity 12 <=14 ng/L   Hepatic function panel     Status: Normal   Result Value Ref Range    Protein Total 7.0 6.4 - 8.3 g/dL    Albumin 4.2 3.5 - 5.2 g/dL    Bilirubin Total 0.2 <=1.2 mg/dL    Alkaline Phosphatase 59 35 - 104 U/L    AST 20 10 - 35 U/L    ALT 14 10 - 35 U/L    Bilirubin Direct <0.20 0.00 - 0.30 mg/dL   CBC with platelets and differential     Status: None   Result Value Ref Range    WBC Count 6.6 4.0 - 11.0 10e3/uL    RBC Count 4.77 3.80 - 5.20 10e6/uL    Hemoglobin 13.6 11.7 - 15.7 g/dL    Hematocrit 40.8 35.0 - 47.0 %    MCV 86 78 - 100 fL    MCH 28.5 26.5 - 33.0 pg    MCHC 33.3 31.5 - 36.5 g/dL    RDW 12.4 10.0 - 15.0 %    Platelet Count 436 150 - 450 10e3/uL    % Neutrophils 52 %    % Lymphocytes 36 %    % Monocytes 8 %    % Eosinophils 1 %    % Basophils 1 %    % Immature Granulocytes 2 %    NRBCs per 100 WBC 0 <1 /100    Absolute Neutrophils 3.5 1.6 - 8.3 10e3/uL    Absolute Lymphocytes 2.4 0.8 - 5.3 10e3/uL    Absolute Monocytes 0.5 0.0 - 1.3 10e3/uL    Absolute Eosinophils 0.1 0.0 - 0.7 10e3/uL    Absolute Basophils 0.0 0.0 - 0.2 10e3/uL    Absolute Immature Granulocytes 0.1 <=0.4 10e3/uL    Absolute NRBCs 0.0 10e3/uL   EKG 12 lead     Status: None (Preliminary result)   Result Value Ref Range    Systolic Blood  Pressure  mmHg    Diastolic Blood Pressure  mmHg    Ventricular Rate 97 BPM    Atrial Rate 97 BPM    LA Interval 136 ms    QRS Duration 86 ms     ms    QTc 444 ms    P Axis 34 degrees    R AXIS -2 degrees    T Axis 5 degrees    Interpretation ECG       Sinus rhythm  Cannot rule out Inferior infarct , age undetermined  Abnormal ECG     CBC with Platelets & Differential     Status: None    Narrative    The following orders were created for panel order CBC with Platelets & Differential.  Procedure                               Abnormality         Status                     ---------                               -----------         ------                     CBC with platelets and d...[[id number]]                      Final result                 Please view results for these tests on the individual orders.     Medications   0.9% sodium chloride BOLUS (1,000 mLs Intravenous New Bag 12/30/22 1836)   0.9% sodium chloride BOLUS (has no administration in time range)   0.9% sodium chloride BOLUS (0 mLs Intravenous Stopped 12/30/22 1835)   iopamidol (ISOVUE-370) solution 69 mL (69 mLs Intravenous Given 12/30/22 1815)   sodium chloride (PF) 0.9% PF flush 98 mL (98 mLs Intravenous Given 12/30/22 1815)        Assessments & Plan (with Medical Decision Making)   Patient presents to the emergency department today for the above complaints.  Record was reviewed, patient was recently admitted on December 22 for influenza A with superimposed bacterial pneumonia and hypoxic respiratory failure.  She has improved from a cough standpoint, but continues to have profuse sweats, fatigue, and palpitations, her primary clinic was concerned about the possibility of PE and sent her here to the emergency department.    Differential diagnosis could include ongoing/persistent infection, also need to consider the possibility of PE or cardiac etiology such as a pericarditis or myocarditis.  Rather doubt acute ACS.  EKG was done which  demonstrates normal sinus rhythm with a rate of 97, there is no sign of ectopy or ischemia.  We did establish IV access and we did draw blood for laboratory analysis.  CBC shows normal white count of 6.6, normal hemoglobin of 13.6, normal platelet count, hepatic panel within normal limits, BMP within normal limits, troponin within normal limits at 12, hCG negative.  Chest CT PE protocol shows no sign of PE, and near complete resolution of previously demonstrated bibasilar consolidative opacities with no new airspace disease.    Patient was given IV fluids here in the ED.  I do not have a great explanation as to why she is having her symptoms including diaphoresis, however everything we are checking today looks normal.  She has no abdominal pain or urinary symptoms.  I did explain this to her, and I have advised her to continue to monitor her symptoms at home, drink plenty of fluids to keep hydrated.  It is possible that she is just still recovering from her recent influenza infection.  She is continuing to notice ongoing symptoms after another week, I would definitely advise that she follow-up with her primary clinic.     Patient was amenable to this, however when the nurse went to discharge her, she then asked for urinalysis.  She again had denied any urinary symptoms when I asked her.  However we did elect to send a UA which shows no sign of infection at this time.    Patient verbalizes understanding of instructions.    I have reviewed the nursing notes. I have reviewed the findings, diagnosis, plan and need for follow up with the patient.    Medical Decision Making  The patient presented with a problem that is an acute illness with systemic symptoms.    The patient's evaluation involved:  ordering and review of 3+ test(s) (see separate area of note for details)  review of 2 test result(s) ordered prior to this encounter (see separate area of note for details)    The patient's management involved only simple and  very low risk treatment.        New Prescriptions    No medications on file       Final diagnoses:   Diaphoresis       --  Coleen Contreras MD   [facility] EMERGENCY DEPARTMENT  12/30/2022     Coleen Contreras MD  12/30/22 1919       Coleen Contreras MD  12/30/22 [id number]

## 2022-12-30 NOTE — ED TRIAGE NOTES
Pt recently had a septoplasty on 12/20, pt spent one night in ED on 12/22 for influenza a and pneumonia. Pt now presents to ED per recommendation from primary for evaluation of potential PE. Pt symptoms are: tachycardia, SOB, dyspnea on exertion, profusely sweating, and feeling cool/clammy.     Triage Assessment     Row Name 12/30/22 1513       Triage Assessment (Adult)    Airway WDL WDL       Respiratory WDL    Respiratory WDL X  pt reports increased effort to breathe       Skin Circulation/Temperature WDL    Skin Circulation/Temperature WDL X  pt reports feeling clammy and cool       Cardiac WDL    Cardiac WDL X;rhythm    Pulse Rate & Regularity tachycardic       Peripheral/Neurovascular WDL    Peripheral Neurovascular WDL WDL       Cognitive/Neuro/Behavioral WDL    Cognitive/Neuro/Behavioral WDL WDL

## 2022-12-31 NOTE — DISCHARGE INSTRUCTIONS
You have been seen in the emergency department today for feeling fatigued and having sweating.  It is entirely possible that your symptoms are due to your recent influenza infection.  Your blood work is normal.  Your chest CT does not show any sign of a blood clot in the lung and does not show any sign of pneumonia.  The pneumonia that you had previously has nearly completely resolved at this point.  Your EKG also looks normal. Your urine sample shows no sign of urinary tract infection.     We recommend continuing to monitor your symptoms at home.  Drink plenty of fluids to keep hydrated.  Use Tylenol or ibuprofen as needed for fevers.  If you continue to notice ongoing symptoms after about a week, we would recommend that you follow-up with your primary clinic.  If you are noticing new symptoms, including abdominal pain, vomiting, burning with urination, fever > 101 or other new symptoms, come back to the ER for a recheck.

## 2023-06-03 ENCOUNTER — HEALTH MAINTENANCE LETTER (OUTPATIENT)
Age: 33
End: 2023-06-03

## 2024-02-02 ENCOUNTER — NURSE TRIAGE (OUTPATIENT)
Dept: NURSING | Facility: CLINIC | Age: 34
End: 2024-02-02
Payer: COMMERCIAL

## 2024-02-02 ENCOUNTER — OFFICE VISIT (OUTPATIENT)
Dept: URGENT CARE | Facility: URGENT CARE | Age: 34
End: 2024-02-02
Payer: COMMERCIAL

## 2024-02-02 VITALS
OXYGEN SATURATION: 98 % | HEART RATE: 107 BPM | BODY MASS INDEX: 33.28 KG/M2 | WEIGHT: 200 LBS | DIASTOLIC BLOOD PRESSURE: 100 MMHG | SYSTOLIC BLOOD PRESSURE: 142 MMHG | RESPIRATION RATE: 16 BRPM | TEMPERATURE: 98 F

## 2024-02-02 DIAGNOSIS — R10.12 LUQ ABDOMINAL PAIN: ICD-10-CM

## 2024-02-02 DIAGNOSIS — R19.7 DIARRHEA OF PRESUMED INFECTIOUS ORIGIN: Primary | ICD-10-CM

## 2024-02-02 DIAGNOSIS — R30.0 DYSURIA: ICD-10-CM

## 2024-02-02 LAB
ADV 40+41 DNA STL QL NAA+NON-PROBE: NEGATIVE
ALBUMIN SERPL BCG-MCNC: 4.5 G/DL (ref 3.5–5.2)
ALBUMIN UR-MCNC: NEGATIVE MG/DL
ALP SERPL-CCNC: 67 U/L (ref 40–150)
ALT SERPL W P-5'-P-CCNC: 10 U/L (ref 0–50)
ANION GAP SERPL CALCULATED.3IONS-SCNC: 8 MMOL/L (ref 7–15)
APPEARANCE UR: CLEAR
AST SERPL W P-5'-P-CCNC: 24 U/L (ref 0–45)
ASTRO TYP 1-8 RNA STL QL NAA+NON-PROBE: NEGATIVE
BASOPHILS # BLD AUTO: 0 10E3/UL (ref 0–0.2)
BASOPHILS NFR BLD AUTO: 0 %
BILIRUB SERPL-MCNC: 0.3 MG/DL
BILIRUB UR QL STRIP: NEGATIVE
BUN SERPL-MCNC: 10.9 MG/DL (ref 6–20)
C CAYETANENSIS DNA STL QL NAA+NON-PROBE: NEGATIVE
C DIFF TOX B STL QL: NEGATIVE
CALCIUM SERPL-MCNC: 9.7 MG/DL (ref 8.6–10)
CAMPYLOBACTER DNA SPEC NAA+PROBE: NEGATIVE
CHLORIDE SERPL-SCNC: 103 MMOL/L (ref 98–107)
COLOR UR AUTO: YELLOW
CREAT SERPL-MCNC: 0.73 MG/DL (ref 0.51–0.95)
CRYPTOSP DNA STL QL NAA+NON-PROBE: NEGATIVE
DEPRECATED HCO3 PLAS-SCNC: 27 MMOL/L (ref 22–29)
E COLI O157 DNA STL QL NAA+NON-PROBE: NORMAL
E HISTOLYT DNA STL QL NAA+NON-PROBE: NEGATIVE
EAEC ASTA GENE ISLT QL NAA+PROBE: NEGATIVE
EC STX1+STX2 GENES STL QL NAA+NON-PROBE: NEGATIVE
EGFRCR SERPLBLD CKD-EPI 2021: >90 ML/MIN/1.73M2
EOSINOPHIL # BLD AUTO: 0.1 10E3/UL (ref 0–0.7)
EOSINOPHIL NFR BLD AUTO: 1 %
EPEC EAE GENE STL QL NAA+NON-PROBE: NEGATIVE
ERYTHROCYTE [DISTWIDTH] IN BLOOD BY AUTOMATED COUNT: 13 % (ref 10–15)
ETEC LTA+ST1A+ST1B TOX ST NAA+NON-PROBE: NEGATIVE
G LAMBLIA DNA STL QL NAA+NON-PROBE: NEGATIVE
GLUCOSE SERPL-MCNC: 95 MG/DL (ref 70–99)
GLUCOSE UR STRIP-MCNC: NEGATIVE MG/DL
HCT VFR BLD AUTO: 41.2 % (ref 35–47)
HGB BLD-MCNC: 13.9 G/DL (ref 11.7–15.7)
HGB UR QL STRIP: NEGATIVE
IMM GRANULOCYTES # BLD: 0 10E3/UL
IMM GRANULOCYTES NFR BLD: 1 %
KETONES UR STRIP-MCNC: NEGATIVE MG/DL
LEUKOCYTE ESTERASE UR QL STRIP: NEGATIVE
LIPASE SERPL-CCNC: 20 U/L (ref 13–60)
LYMPHOCYTES # BLD AUTO: 1.8 10E3/UL (ref 0.8–5.3)
LYMPHOCYTES NFR BLD AUTO: 31 %
MCH RBC QN AUTO: 28.5 PG (ref 26.5–33)
MCHC RBC AUTO-ENTMCNC: 33.7 G/DL (ref 31.5–36.5)
MCV RBC AUTO: 84 FL (ref 78–100)
MONOCYTES # BLD AUTO: 0.4 10E3/UL (ref 0–1.3)
MONOCYTES NFR BLD AUTO: 8 %
NEUTROPHILS # BLD AUTO: 3.3 10E3/UL (ref 1.6–8.3)
NEUTROPHILS NFR BLD AUTO: 59 %
NITRATE UR QL: NEGATIVE
NOROVIRUS GI+II RNA STL QL NAA+NON-PROBE: NEGATIVE
P SHIGELLOIDES DNA STL QL NAA+NON-PROBE: NEGATIVE
PH UR STRIP: 7 [PH] (ref 5–7)
PLATELET # BLD AUTO: 365 10E3/UL (ref 150–450)
POTASSIUM SERPL-SCNC: 4.1 MMOL/L (ref 3.4–5.3)
PROT SERPL-MCNC: 7.2 G/DL (ref 6.4–8.3)
RBC # BLD AUTO: 4.88 10E6/UL (ref 3.8–5.2)
RVA RNA STL QL NAA+NON-PROBE: NEGATIVE
SALMONELLA SP RPOD STL QL NAA+PROBE: NEGATIVE
SAPO I+II+IV+V RNA STL QL NAA+NON-PROBE: NEGATIVE
SHIGELLA SP+EIEC IPAH ST NAA+NON-PROBE: NEGATIVE
SODIUM SERPL-SCNC: 138 MMOL/L (ref 135–145)
SP GR UR STRIP: 1.02 (ref 1–1.03)
UROBILINOGEN UR STRIP-ACNC: 0.2 E.U./DL
V CHOLERAE DNA SPEC QL NAA+PROBE: NEGATIVE
VIBRIO DNA SPEC NAA+PROBE: NEGATIVE
WBC # BLD AUTO: 5.6 10E3/UL (ref 4–11)
Y ENTEROCOL DNA STL QL NAA+PROBE: NEGATIVE

## 2024-02-02 PROCEDURE — 83690 ASSAY OF LIPASE: CPT | Performed by: EMERGENCY MEDICINE

## 2024-02-02 PROCEDURE — 36415 COLL VENOUS BLD VENIPUNCTURE: CPT | Performed by: EMERGENCY MEDICINE

## 2024-02-02 PROCEDURE — 85025 COMPLETE CBC W/AUTO DIFF WBC: CPT | Performed by: EMERGENCY MEDICINE

## 2024-02-02 PROCEDURE — 99204 OFFICE O/P NEW MOD 45 MIN: CPT | Performed by: EMERGENCY MEDICINE

## 2024-02-02 PROCEDURE — 87493 C DIFF AMPLIFIED PROBE: CPT | Mod: 59 | Performed by: EMERGENCY MEDICINE

## 2024-02-02 PROCEDURE — 81003 URINALYSIS AUTO W/O SCOPE: CPT | Performed by: EMERGENCY MEDICINE

## 2024-02-02 PROCEDURE — 80053 COMPREHEN METABOLIC PANEL: CPT | Performed by: EMERGENCY MEDICINE

## 2024-02-02 PROCEDURE — 87507 IADNA-DNA/RNA PROBE TQ 12-25: CPT | Performed by: EMERGENCY MEDICINE

## 2024-02-02 NOTE — TELEPHONE ENCOUNTER
"Triage Call:     Pt calling to report that she has had \"burning\" upper stomach pain for the past two days   Pain is currently rated 5/10  She is taking pepto bismol, so her stools are now black  Burping  Gas  Stomach was bloated; looked like she was pregnant, but now the bloating has gone away.   When she eats she feels better and than other times it burns more    Disposition: ED/UCC or office with PCP approval. Pt does not have a PCP within [facility] and she plans to go to the C nearby. Care advice given.     Reason for Disposition   MILD TO MODERATE constant pain lasting > 2 hours    Additional Information   Negative: Passed out (i.e., fainted, collapsed and was not responding)   Negative: Shock suspected (e.g., cold/pale/clammy skin, too weak to stand, low BP, rapid pulse)   Negative: Sounds like a life-threatening emergency to the triager   Negative: SEVERE abdominal pain (e.g., excruciating)   Negative: Black or tarry bowel movements  (Exception: Chronic-unchanged black-grey BMs AND is taking iron pills or Pepto-Bismol.)   Negative: Vomiting red blood or black (coffee ground) material   Negative: Blood in bowel movements  (Exception: Blood on surface of BM with constipation.)    Protocols used: Abdominal Pain - Female-A-[place]  Leslee Gay RN  [facility] Nurse Advisor 10:07 AM 2/2/2024  "

## 2024-02-02 NOTE — PROGRESS NOTES
Assessment & Plan     Diagnosis:    ICD-10-CM    1. Diarrhea of presumed infectious origin  R19.7 lidocaine (viscous) (XYLOCAINE) 2 % 15 mL, alum & mag hydroxide-simethicone (MAALOX) 15 mL GI Cocktail     Comprehensive metabolic panel (BMP + Alb, Alk Phos, ALT, AST, Total. Bili, TP)     Lipase     CBC with platelets and differential     C. difficile Toxin B PCR with reflex to C. difficile Antigen and Toxins A/B EIA     Enteric Bacteria and Virus Panel by PETR Stool     Comprehensive metabolic panel (BMP + Alb, Alk Phos, ALT, AST, Total. Bili, TP)     Lipase     CBC with platelets and differential     C. difficile Toxin B PCR with reflex to C. difficile Antigen and Toxins A/B EIA     Enteric Bacteria and Virus Panel by PETR Stool      2. LUQ abdominal pain  R10.12 lidocaine (viscous) (XYLOCAINE) 2 % 15 mL, alum & mag hydroxide-simethicone (MAALOX) 15 mL GI Cocktail     Comprehensive metabolic panel (BMP + Alb, Alk Phos, ALT, AST, Total. Bili, TP)     Lipase     CBC with platelets and differential     C. difficile Toxin B PCR with reflex to C. difficile Antigen and Toxins A/B EIA     Enteric Bacteria and Virus Panel by PETR Stool     Comprehensive metabolic panel (BMP + Alb, Alk Phos, ALT, AST, Total. Bili, TP)     Lipase     CBC with platelets and differential     C. difficile Toxin B PCR with reflex to C. difficile Antigen and Toxins A/B EIA     Enteric Bacteria and Virus Panel by PETR Stool      3. Dysuria  R30.0 lidocaine (viscous) (XYLOCAINE) 2 % 15 mL, alum & mag hydroxide-simethicone (MAALOX) 15 mL GI Cocktail     Comprehensive metabolic panel (BMP + Alb, Alk Phos, ALT, AST, Total. Bili, TP)     Lipase     CBC with platelets and differential     C. difficile Toxin B PCR with reflex to C. difficile Antigen and Toxins A/B EIA     Enteric Bacteria and Virus Panel by PETR Stool     UA Macroscopic with reflex to Microscopic and Culture - Lab Collect     UA Macroscopic with reflex to Microscopic and Culture - Lab Collect      Comprehensive metabolic panel (BMP + Alb, Alk Phos, ALT, AST, Total. Bili, TP)     Lipase     CBC with platelets and differential     C. difficile Toxin B PCR with reflex to C. difficile Antigen and Toxins A/B EIA     Enteric Bacteria and Virus Panel by PETR Stool          Medical Decision Making:  Thalia Felix is a 33 year old female who presents for evaluation of diarrhea. I considered a broad differential of course; the differential diagnosis of diarrhea is broad and includes such etiologies as viral gastroenteritis, traveler's diarrhea, giardia, colitis, GI bleed, bacterial infection of the large intestine (salmonella, shigella, campylobacter, e coli, etc), parasite, etc.  There are no signs of worrisome intra-abdominal pathologies detected during the visit today and no blood per patient report.  The patient has a benign abdominal exam without rebound, guarding, or marked tenderness to palpation. CBC shows normal WBC and hemoglobin.  CMP and lipase pending at this time.  She had no improvement with GI cocktail.  Given recent antibiotic use, C. difficile testing will be obtained, she was sent home with stool test kit for this and other enteric stool panel and will return this at her earliest convenience.  Supportive outpatient management is therefore indicated.  Abdominal pain precautions are given for home.   No indication for stool studies at this time.  No indication for CT at this time.  It was discussed with the patient to go to the ED for blood in stool, increasing pain, or fevers more than 102 F.        ORIN Sullivan [facility] URGENT [facility]ARE    Subjective     Thalia Felix is a 33 year old female who presents to clinic today for the following health issues:  Chief Complaint   Patient presents with    Urgent Care    Abdominal Pain     X5 days. Rash on face. Sever pain x2 days. Reflex.     Diarrhea    UTI     Burning urination.     Nausea       HPI  Patient with 5 days of left upper  quadrant abdominal discomfort, nausea, diarrhea.  She states that she has had multiple episodes every single day, was initially watery and brown, now has turned darker and slightly black after Pepto-Bismol use.  She notes no bloody stools.  She also feels she has some heartburn symptoms as well, is wondering if these are connected.  She vomited once a few days ago, but none since.  Has been trying to push fluids, does feel slightly lightheaded.  Also notes that she had a UTI recently and was given antibiotics.  No recent travel or hospitalizations.  No fevers, profound lightheadedness, chest pain, shortness of breath, flank pain or other concerns    Review of Systems    See HPI    Objective      Vitals: BP (!) 142/100   Pulse 107   Temp 98  F (36.7  C) (Tympanic)   Resp 16   Wt 90.7 kg (200 lb)   SpO2 98%   BMI 33.28 kg/m      Patient Vitals for the past 24 hrs:   BP Temp Temp src Pulse Resp SpO2 Weight   02/02/24 1051 -- 98  F (36.7  C) Tympanic -- -- -- --   02/02/24 1046 (!) 142/100 100  F (37.8  C) Temporal 107 16 98 % 90.7 kg (200 lb)       Vital signs reviewed by: Milton Nicholson PA-C    Physical Exam   Constitutional: Patient is alert and cooperative. No acute distress.  Mouth: Mucous membranes are moist.   Cardiovascular: Regular rate and rhythm  Pulmonary/Chest: Lungs are clear to auscultation throughout. Effort normal. No respiratory distress. No wheezes, rales or rhonchi.  GI: LUQ abdominal tenderness to palpation.  No rebound or guarding.  Bowel sounds are hyperactive.  No CVA tenderness bilaterally.  Neurological: Alert and oriented x3.   Skin: No rash noted on visualized skin.  Psychiatric:The patient has a normal mood and affect.     Labs/Imaging:  Results for orders placed or performed in visit on 02/02/24   UA Macroscopic with reflex to Microscopic and Culture - Lab Collect     Status: Normal    Specimen: Urine, Midstream   Result Value Ref Range    Color Urine Yellow Colorless, Straw, Light  Yellow, Yellow    Appearance Urine Clear Clear    Glucose Urine Negative Negative mg/dL    Bilirubin Urine Negative Negative    Ketones Urine Negative Negative mg/dL    Specific Gravity Urine 1.020 1.003 - 1.035    Blood Urine Negative Negative    pH Urine 7.0 5.0 - 7.0    Protein Albumin Urine Negative Negative mg/dL    Urobilinogen Urine 0.2 0.2, 1.0 E.U./dL    Nitrite Urine Negative Negative    Leukocyte Esterase Urine Negative Negative    Narrative    Microscopic not indicated   CBC with platelets and differential     Status: None   Result Value Ref Range    WBC Count 5.6 4.0 - 11.0 10e3/uL    RBC Count 4.88 3.80 - 5.20 10e6/uL    Hemoglobin 13.9 11.7 - 15.7 g/dL    Hematocrit 41.2 35.0 - 47.0 %    MCV 84 78 - 100 fL    MCH 28.5 26.5 - 33.0 pg    MCHC 33.7 31.5 - 36.5 g/dL    RDW 13.0 10.0 - 15.0 %    Platelet Count 365 150 - 450 10e3/uL    % Neutrophils 59 %    % Lymphocytes 31 %    % Monocytes 8 %    % Eosinophils 1 %    % Basophils 0 %    % Immature Granulocytes 1 %    Absolute Neutrophils 3.3 1.6 - 8.3 10e3/uL    Absolute Lymphocytes 1.8 0.8 - 5.3 10e3/uL    Absolute Monocytes 0.4 0.0 - 1.3 10e3/uL    Absolute Eosinophils 0.1 0.0 - 0.7 10e3/uL    Absolute Basophils 0.0 0.0 - 0.2 10e3/uL    Absolute Immature Granulocytes 0.0 <=0.4 10e3/uL   CBC with platelets and differential     Status: None    Narrative    The following orders were created for panel order CBC with platelets and differential.  Procedure                               Abnormality         Status                     ---------                               -----------         ------                     CBC with platelets and d...[[id number]]                      Final result                 Please view results for these tests on the individual orders.         Interventions:  GI Cocktail (Maalox/Mylanta and viscous Lidocaine), 30 mL suspension, PO       Milton Nicholson PA-C, February 2, 2024

## 2024-02-06 ENCOUNTER — HOSPITAL ENCOUNTER (EMERGENCY)
Facility: CLINIC | Age: 34
Discharge: HOME | End: 2024-02-06
Attending: EMERGENCY MEDICINE | Admitting: EMERGENCY MEDICINE
Payer: COMMERCIAL

## 2024-02-06 ENCOUNTER — NURSE TRIAGE (OUTPATIENT)
Dept: NURSING | Facility: CLINIC | Age: 34
End: 2024-02-06
Payer: COMMERCIAL

## 2024-02-06 VITALS
HEART RATE: 95 BPM | RESPIRATION RATE: 14 BRPM | TEMPERATURE: 98.1 F | OXYGEN SATURATION: 98 % | WEIGHT: 220 LBS | DIASTOLIC BLOOD PRESSURE: 72 MMHG | SYSTOLIC BLOOD PRESSURE: 111 MMHG | HEIGHT: 65 IN | BODY MASS INDEX: 36.65 KG/M2

## 2024-02-06 DIAGNOSIS — M54.41 ACUTE RIGHT-SIDED LOW BACK PAIN WITH RIGHT-SIDED SCIATICA: ICD-10-CM

## 2024-02-06 PROCEDURE — 250N000013 HC RX MED GY IP 250 OP 250 PS 637: Performed by: EMERGENCY MEDICINE

## 2024-02-06 PROCEDURE — 250N000012 HC RX MED GY IP 250 OP 636 PS 637: Performed by: EMERGENCY MEDICINE

## 2024-02-06 PROCEDURE — 99284 EMERGENCY DEPT VISIT MOD MDM: CPT | Performed by: EMERGENCY MEDICINE

## 2024-02-06 RX ORDER — OXYCODONE HYDROCHLORIDE 5 MG/1
5 TABLET ORAL ONCE
Status: COMPLETED | OUTPATIENT
Start: 2024-02-06 | End: 2024-02-06

## 2024-02-06 RX ORDER — GABAPENTIN 300 MG/1
300 CAPSULE ORAL 3 TIMES DAILY
Qty: 30 CAPSULE | Refills: 0 | Status: SHIPPED | OUTPATIENT
Start: 2024-02-06 | End: 2024-02-06

## 2024-02-06 RX ORDER — OXYCODONE HYDROCHLORIDE 5 MG/1
5 TABLET ORAL EVERY 6 HOURS PRN
Qty: 12 TABLET | Refills: 0 | Status: SHIPPED | OUTPATIENT
Start: 2024-02-06 | End: 2024-02-09

## 2024-02-06 RX ORDER — LIDOCAINE 4 G/G
1 PATCH TOPICAL EVERY 24 HOURS
Qty: 7 PATCH | Refills: 0 | Status: SHIPPED | OUTPATIENT
Start: 2024-02-06

## 2024-02-06 RX ORDER — ACETAMINOPHEN 500 MG
1000 TABLET ORAL ONCE
Status: COMPLETED | OUTPATIENT
Start: 2024-02-06 | End: 2024-02-06

## 2024-02-06 RX ORDER — PREDNISONE 20 MG/1
40 TABLET ORAL ONCE
Status: COMPLETED | OUTPATIENT
Start: 2024-02-06 | End: 2024-02-06

## 2024-02-06 RX ADMIN — PREDNISONE 40 MG: 20 TABLET ORAL at 06:14

## 2024-02-06 RX ADMIN — ACETAMINOPHEN 1000 MG: 500 TABLET ORAL at 06:14

## 2024-02-06 RX ADMIN — OXYCODONE HYDROCHLORIDE 5 MG: 5 TABLET ORAL at 06:14

## 2024-02-06 ASSESSMENT — ACTIVITIES OF DAILY LIVING (ADL): ADLS_ACUITY_SCORE: 36

## 2024-02-06 NOTE — TELEPHONE ENCOUNTER
Nurse Triage SBAR    Is this a 2nd Level Triage? NO    Situation:   Patient is calling to report severe lower back pain.    Background:   She has a history of bulging discs from her last MRI in Dec 2022.  No history of back surgeries.    Assessment:   Pain started over 24 hours ago.  Pt is unable to sit or lay down due to the pain.  Pain starts in there lower back that wraps around to her sides into her legs including feet.  Pain is burning.  Pain feels better with standing and walking is difficult.     She recently has been having LUQ pain with diarrhea and had done a CT.  No results yet.    Protocol Recommended Disposition:   Go to ED Now (Or PCP Triage)    Recommendation:   Advised pt to be seen in the ED.   Patient verbalized understanding.     Tenisha Tilley, RN, BSN Nurse Triage Advisor 2/6/2024 4:17 AM       Reason for Disposition   Weakness of a leg or foot (e.g., unable to bear weight, dragging foot)    Additional Information   Negative: Passed out (i.e., lost consciousness, collapsed and was not responding)   Negative: Shock suspected (e.g., cold/pale/clammy skin, too weak to stand, low BP, rapid pulse)   Negative: Sounds like a life-threatening emergency to the triager   Negative: [1] SEVERE back pain (e.g., excruciating) AND [2] sudden onset AND [3] age > 60 years   Negative: [1] Unable to urinate (or only a few drops) > 4 hours AND [2] bladder feels very full (e.g., palpable bladder or strong urge to urinate)   Negative: [1] Loss of bladder or bowel control (urine or bowel incontinence; wetting self, leaking stool) AND [2] new-onset   Negative: Numbness in groin or rectal area (i.e., loss of sensation)   Negative: [1] SEVERE abdominal pain AND [2] present > 1 hour   Negative: [1] Abdominal pain AND [2] age > 60 years    Protocols used: Back Pain-A-

## 2024-02-06 NOTE — ED TRIAGE NOTES
Triage Assessment (Adult)       Row Name 02/06/24 0531          Triage Assessment    Airway WDL WDL        Respiratory WDL    Respiratory WDL WDL        Skin Circulation/Temperature WDL    Skin Circulation/Temperature WDL WDL        Cardiac WDL    Cardiac WDL WDL        Peripheral/Neurovascular WDL    Peripheral Neurovascular WDL WDL        Cognitive/Neuro/Behavioral WDL    Cognitive/Neuro/Behavioral WDL WDL

## 2024-02-06 NOTE — ED TRIAGE NOTES
Pt arrives to ED from home per self/private vehicle with C/O low back pain radiating to front of thighs and down to her feet, voices having flexeril by her primary yesterday but has not had any relief, denies having taken tylenol or ibuprofen, denies injury or loss of bowel/bladder, pt is A/O x 4, afebrile, VSS     Triage Assessment (Adult)       Row Name 02/06/24 0531          Triage Assessment    Airway WDL WDL        Respiratory WDL    Respiratory WDL WDL        Skin Circulation/Temperature WDL    Skin Circulation/Temperature WDL WDL        Cardiac WDL    Cardiac WDL WDL        Peripheral/Neurovascular WDL    Peripheral Neurovascular WDL WDL        Cognitive/Neuro/Behavioral WDL    Cognitive/Neuro/Behavioral WDL WDL

## 2024-02-06 NOTE — DISCHARGE INSTRUCTIONS
Please make an appointment to follow up with Your Primary Care Provider as soon as possible discussed physical therapy.    Return to the emergency department if you develop fevers, incontinence, weakness of your legs, numbness between your legs.    Take the steroids at home as you have been prescribed

## 2024-02-06 NOTE — ED PROVIDER NOTES
ED Provider Note  [facility]      History     Chief Complaint   Patient presents with    Back Pain     HPI  Thalia Felix is a 33 year old female with a hx of bulging disc and sciatic pain diagnosed in 2016, who presents with severe sacral pain radiating to both legs.    She states that  the pain started 24 hrs ago and describes it as squeezing, achy and dull that is better with standing and worse with laying down and walking. She had mild sciatic pain involving the left leg in past but the current pain is bilateral, more severe and 9/10 in severity. The pain starts in the sacral region and shoots down her legs. Her last DUANE was in 2020. She had diarrhea and LUQ pain for a week before the start of the pain. She denies fever, chills, urinary and bladder incontinence, hx of cancer, coagulopathy, or spinal procedure.    Past Medical History  History reviewed. No pertinent past medical history.  History reviewed. No pertinent surgical history.  gabapentin (NEURONTIN) 300 MG capsule  Lidocaine (LIDOCARE) 4 % Patch  amoxicillin-clavulanate (AUGMENTIN) 875-125 MG tablet  eletriptan (RELPAX) 40 MG tablet  ondansetron (ZOFRAN) 4 MG tablet  oseltamivir (TAMIFLU) 75 MG capsule      Allergies   Allergen Reactions    Pseudoephedrine     Sulfa Antibiotics Itching    Hydromorphone Hives, Itching and Rash    Levofloxacin Hives, Itching and Rash     Family History  History reviewed. No pertinent family history.  Social History   Social History     Tobacco Use    Smoking status: Never    Smokeless tobacco: Never      Past medical history, past surgical history, medications, allergies, family history, and social history were reviewed with the patient. No additional pertinent items.      A medically appropriate review of systems was performed with pertinent positives and negatives noted in the HPI, and all other systems negative.    Physical Exam   BP: 115/81  Pulse: 111  Temp: 98.3  F (36.8  C)  Resp:  "18  Height: 165.1 cm (5' 5\")  Weight: 99.8 kg (220 lb)  SpO2: 96 %  Physical Exam  Constitutional:       Appearance: Normal appearance. She is obese.   HENT:      Head: Normocephalic and atraumatic.      Nose: Nose normal.   Eyes:      Conjunctiva/sclera: Conjunctivae normal.   Pulmonary:      Effort: Pulmonary effort is normal.   Skin:     General: Skin is warm and dry.   Neurological:      General: No focal deficit present.      Mental Status: She is alert and oriented to person, place, and time.      Comments: Endorses sacral pain, tender to palpation at the right ride of the sacrum.   Psychiatric:         Mood and Affect: Mood normal.         Behavior: Behavior normal.         Thought Content: Thought content normal.         ED Course, Procedures, & Data      Procedures            No results found for any visits on 02/06/24.  Medications   oxyCODONE (ROXICODONE) tablet 5 mg (5 mg Oral $Given 2/6/24 0614)   acetaminophen (TYLENOL) tablet 1,000 mg (1,000 mg Oral $Given 2/6/24 0614)   predniSONE (DELTASONE) tablet 40 mg (40 mg Oral $Given 2/6/24 0614)     Labs Ordered and Resulted from Time of ED Arrival to Time of ED Departure - No data to display  No orders to display          Critical care was not performed.     Medical Decision Making  The patient's presentation was of moderate complexity (an acute illness with systemic symptoms).    The patient's evaluation involved:  strong consideration of a test (MR l spine) that was ultimately deferred    The patient's management necessitated moderate risk (prescription drug management including medications given in the ED).    Assessment & Plan    Thalia Felix is a 33 year old female with a hx of bulging disc and sciatic pain who presents with severe sacral pain radiating to both legs. Exams show tenderness to palpation of the sacral region    Differential diagnosis includes sciatica, spinal abscess, kidney stones, AAA, osteomyelitis, MSK strain or sprain. The " patient has no red flags such as fever, FND, percussive tenderness, spinal procedure making spinal abscess  and osteomyelitis unlikely. Lack of flank pain makes kidney stones unlikely and her age makes AAA unlikely. The characteristics of the pain is more consistent with sciatica exacerbation or MSK strain or sprain.      Plan   - symptomatic management with oxycodone, gabapentin and steroids   - recommend physical therapy     I have reviewed the nursing notes. I have reviewed the findings, diagnosis, plan and need for follow up with the patient.    Taran Alejandro MS4      New Prescriptions    No medications on file       Final diagnoses:   Acute right-sided low back pain with right-sided sciatica     --    ED Attending Physician Attestation    I Jonn Noriega MD, cared for this patient with the Medical Student. I performed, or re-performed, the physical exam and medical decision-making. I have verified the accuracy of all the medical student findings and documentation above, and have edited as necessary.    Summary of HPI, PE, ED Course   Patient is a 33 year old female evaluated in the emergency department for low back pain. Exam and ED course notable for reproducible paraspinous muscle tenderness with sciatica, no red flags to suggest cauda equina, epidural abscess, epidural hematoma or other acute process requiring MRI.  Benign abdomen.  Very unlikely intra-abdominal catastrophe.  Will discharge with pain medications, discussed likely prolonged course of pain and likely requirement for physical therapy and possible MRI later down the line.  She is given return precautions. After the completion of care in the emergency department, the patient was discharged.    Critical Care & Procedures  Not applicable.          Jonn Noriega MD  Emergency Medicine       Jonn Noriega  [facility] located within [facility] EMERGENCY DEPARTMENT  2/6/2024     Jonn Noriega MD  02/06/24 0711

## 2024-07-07 ENCOUNTER — HEALTH MAINTENANCE LETTER (OUTPATIENT)
Age: 34
End: 2024-07-07

## 2025-07-19 ENCOUNTER — HEALTH MAINTENANCE LETTER (OUTPATIENT)
Age: 35
End: 2025-07-19